# Patient Record
Sex: FEMALE | Race: WHITE | NOT HISPANIC OR LATINO
[De-identification: names, ages, dates, MRNs, and addresses within clinical notes are randomized per-mention and may not be internally consistent; named-entity substitution may affect disease eponyms.]

---

## 2017-06-05 ENCOUNTER — APPOINTMENT (OUTPATIENT)
Dept: PULMONOLOGY | Facility: CLINIC | Age: 57
End: 2017-06-05

## 2017-06-05 VITALS
HEIGHT: 65 IN | SYSTOLIC BLOOD PRESSURE: 140 MMHG | BODY MASS INDEX: 40.65 KG/M2 | HEART RATE: 82 BPM | OXYGEN SATURATION: 96 % | DIASTOLIC BLOOD PRESSURE: 80 MMHG | RESPIRATION RATE: 17 BRPM | WEIGHT: 244 LBS

## 2017-06-05 DIAGNOSIS — J45.901 ACUTE BRONCHITIS, UNSPECIFIED: ICD-10-CM

## 2017-06-05 DIAGNOSIS — R06.02 SHORTNESS OF BREATH: ICD-10-CM

## 2017-06-05 DIAGNOSIS — Z86.711 PERSONAL HISTORY OF PULMONARY EMBOLISM: ICD-10-CM

## 2017-06-05 DIAGNOSIS — Z86.39 PERSONAL HISTORY OF OTHER ENDOCRINE, NUTRITIONAL AND METABOLIC DISEASE: ICD-10-CM

## 2017-06-05 DIAGNOSIS — K21.9 GASTRO-ESOPHAGEAL REFLUX DISEASE W/OUT ESOPHAGITIS: ICD-10-CM

## 2017-06-05 DIAGNOSIS — J45.909 UNSPECIFIED ASTHMA, UNCOMPLICATED: ICD-10-CM

## 2017-06-05 DIAGNOSIS — J30.9 ALLERGIC RHINITIS, UNSPECIFIED: ICD-10-CM

## 2017-06-05 DIAGNOSIS — J20.9 ACUTE BRONCHITIS, UNSPECIFIED: ICD-10-CM

## 2017-06-05 DIAGNOSIS — R93.8 ABNORMAL FINDINGS ON DIAGNOSTIC IMAGING OF OTHER SPECIFIED BODY STRUCTURES: ICD-10-CM

## 2017-06-05 RX ORDER — PANTOPRAZOLE 40 MG/1
40 TABLET, DELAYED RELEASE ORAL
Refills: 0 | Status: ACTIVE | COMMUNITY

## 2017-06-05 RX ORDER — ALBUTEROL SULFATE 2.5 MG/3ML
(2.5 MG/3ML) SOLUTION RESPIRATORY (INHALATION)
Qty: 120 | Refills: 3 | Status: ACTIVE | COMMUNITY
Start: 2017-06-05 | End: 1900-01-01

## 2017-06-06 ENCOUNTER — TRANSCRIPTION ENCOUNTER (OUTPATIENT)
Age: 57
End: 2017-06-06

## 2017-06-20 ENCOUNTER — MEDICATION RENEWAL (OUTPATIENT)
Age: 57
End: 2017-06-20

## 2017-07-31 ENCOUNTER — OUTPATIENT (OUTPATIENT)
Dept: OUTPATIENT SERVICES | Facility: HOSPITAL | Age: 57
LOS: 1 days | End: 2017-07-31
Payer: COMMERCIAL

## 2017-07-31 VITALS
OXYGEN SATURATION: 96 % | WEIGHT: 250 LBS | HEART RATE: 77 BPM | SYSTOLIC BLOOD PRESSURE: 114 MMHG | TEMPERATURE: 98 F | HEIGHT: 64 IN | RESPIRATION RATE: 16 BRPM | DIASTOLIC BLOOD PRESSURE: 74 MMHG

## 2017-07-31 DIAGNOSIS — I20.0 UNSTABLE ANGINA: ICD-10-CM

## 2017-07-31 LAB
ALBUMIN SERPL ELPH-MCNC: 4.5 G/DL — SIGNIFICANT CHANGE UP (ref 3.3–5)
ALP SERPL-CCNC: 102 U/L — SIGNIFICANT CHANGE UP (ref 40–120)
ALT FLD-CCNC: 18 U/L RC — SIGNIFICANT CHANGE UP (ref 10–45)
ANION GAP SERPL CALC-SCNC: 15 MMOL/L — SIGNIFICANT CHANGE UP (ref 5–17)
AST SERPL-CCNC: 22 U/L — SIGNIFICANT CHANGE UP (ref 10–40)
BILIRUB SERPL-MCNC: 0.4 MG/DL — SIGNIFICANT CHANGE UP (ref 0.2–1.2)
BUN SERPL-MCNC: 11 MG/DL — SIGNIFICANT CHANGE UP (ref 7–23)
CALCIUM SERPL-MCNC: 10 MG/DL — SIGNIFICANT CHANGE UP (ref 8.4–10.5)
CHLORIDE SERPL-SCNC: 108 MMOL/L — SIGNIFICANT CHANGE UP (ref 96–108)
CO2 SERPL-SCNC: 20 MMOL/L — LOW (ref 22–31)
CREAT SERPL-MCNC: 1.21 MG/DL — SIGNIFICANT CHANGE UP (ref 0.5–1.3)
GLUCOSE SERPL-MCNC: 107 MG/DL — HIGH (ref 70–99)
HCT VFR BLD CALC: 39.2 % — SIGNIFICANT CHANGE UP (ref 34.5–45)
HGB BLD-MCNC: 13 G/DL — SIGNIFICANT CHANGE UP (ref 11.5–15.5)
MCHC RBC-ENTMCNC: 29.3 PG — SIGNIFICANT CHANGE UP (ref 27–34)
MCHC RBC-ENTMCNC: 33.1 GM/DL — SIGNIFICANT CHANGE UP (ref 32–36)
MCV RBC AUTO: 88.3 FL — SIGNIFICANT CHANGE UP (ref 80–100)
PLATELET # BLD AUTO: 167 K/UL — SIGNIFICANT CHANGE UP (ref 150–400)
POTASSIUM SERPL-MCNC: 4.1 MMOL/L — SIGNIFICANT CHANGE UP (ref 3.5–5.3)
POTASSIUM SERPL-SCNC: 4.1 MMOL/L — SIGNIFICANT CHANGE UP (ref 3.5–5.3)
PROT SERPL-MCNC: 7.3 G/DL — SIGNIFICANT CHANGE UP (ref 6–8.3)
RBC # BLD: 4.43 M/UL — SIGNIFICANT CHANGE UP (ref 3.8–5.2)
RBC # FLD: 13.6 % — SIGNIFICANT CHANGE UP (ref 10.3–14.5)
SODIUM SERPL-SCNC: 143 MMOL/L — SIGNIFICANT CHANGE UP (ref 135–145)
WBC # BLD: 6 K/UL — SIGNIFICANT CHANGE UP (ref 3.8–10.5)
WBC # FLD AUTO: 6 K/UL — SIGNIFICANT CHANGE UP (ref 3.8–10.5)

## 2017-07-31 PROCEDURE — 93005 ELECTROCARDIOGRAM TRACING: CPT

## 2017-07-31 PROCEDURE — 93454 CORONARY ARTERY ANGIO S&I: CPT

## 2017-07-31 PROCEDURE — 99153 MOD SED SAME PHYS/QHP EA: CPT

## 2017-07-31 PROCEDURE — 93010 ELECTROCARDIOGRAM REPORT: CPT

## 2017-07-31 PROCEDURE — 93454 CORONARY ARTERY ANGIO S&I: CPT | Mod: 26,GC

## 2017-07-31 PROCEDURE — 99152 MOD SED SAME PHYS/QHP 5/>YRS: CPT

## 2017-07-31 PROCEDURE — C1887: CPT

## 2017-07-31 PROCEDURE — C1894: CPT

## 2017-07-31 PROCEDURE — C1769: CPT

## 2017-07-31 PROCEDURE — 80053 COMPREHEN METABOLIC PANEL: CPT

## 2017-07-31 PROCEDURE — 85027 COMPLETE CBC AUTOMATED: CPT

## 2017-07-31 NOTE — H&P CARDIOLOGY - HISTORY OF PRESENT ILLNESS
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 Umana c/o episodes of chest tightness associated with LE weakness and dyspnea one week ago.  Patient states symptoms resolved with rest.  Patient endorsing stress test 1-2 years ago with normal findings (report unavailable) and states that she feels she could not withstand stress testing now.  Decision was made to proceed with [facility] for which patient presents today.

## 2017-07-31 NOTE — H&P CARDIOLOGY - PSH
Dysphonia  gel injection into vocal cord 1/11  Mass  benign mas removed between heart and spine, 8/07  S/P Dilatation and Curettage  9/09, uterine ablation  S/P Tonsillectomy and Adenoidectomy    Status Post Umbilical Hernia Repair, Follow-Up Exam  10/05  Tubal Ligation  1995

## 2017-07-31 NOTE — H&P CARDIOLOGY - FAMILY HISTORY
Father  Still living? Unknown  Family history of cardiovascular disease, Age at diagnosis: Age Unknown     Mother  Still living? Unknown  Family history of cardiovascular disease, Age at diagnosis: Age Unknown

## 2017-07-31 NOTE — H&P CARDIOLOGY - PMH
Antiphospholipid syndrome    Asthma    CVA (Cerebral Vascular Accident)  lacunar 1/09 and intracranial bleed 1992  Diverticulitis    DUB (Dysfunctional Uterine Bleeding)  2009  DVT (Deep Venous Thrombosis)  1975, 1982  History of Pulmonary Embolism  8/07  Hypercholesterolemia    Hypertension  1992  USAMA (obstructive sleep apnea)    Spasmodic Dysphonia  11/10  Uterine Prolapse

## 2017-09-05 ENCOUNTER — APPOINTMENT (OUTPATIENT)
Dept: PULMONOLOGY | Facility: CLINIC | Age: 57
End: 2017-09-05

## 2018-01-29 ENCOUNTER — RX RENEWAL (OUTPATIENT)
Age: 58
End: 2018-01-29

## 2022-11-16 ENCOUNTER — TRANSCRIPTION ENCOUNTER (OUTPATIENT)
Age: 62
End: 2022-11-16

## 2022-11-16 PROBLEM — J45.909 UNSPECIFIED ASTHMA, UNCOMPLICATED: Chronic | Status: ACTIVE | Noted: 2017-07-31

## 2022-11-16 PROBLEM — G47.33 OBSTRUCTIVE SLEEP APNEA (ADULT) (PEDIATRIC): Chronic | Status: ACTIVE | Noted: 2017-07-31

## 2023-01-11 ENCOUNTER — APPOINTMENT (OUTPATIENT)
Dept: ORTHOPEDIC SURGERY | Facility: CLINIC | Age: 63
End: 2023-01-11
Payer: COMMERCIAL

## 2023-01-11 VITALS — BODY MASS INDEX: 39.15 KG/M2 | WEIGHT: 235 LBS | HEIGHT: 65 IN

## 2023-01-11 PROCEDURE — 99214 OFFICE O/P EST MOD 30 MIN: CPT

## 2023-01-11 PROCEDURE — 72170 X-RAY EXAM OF PELVIS: CPT

## 2023-01-11 PROCEDURE — 72110 X-RAY EXAM L-2 SPINE 4/>VWS: CPT

## 2023-01-11 NOTE — DISCUSSION/SUMMARY
[de-identified] : degenerative spondylolisthesis with worsening back/leg symptoms - indicated for updated lumbar MRi \par discussion of tx optoins \par flexeril/MDP \par fu to review the MRi

## 2023-01-11 NOTE — HISTORY OF PRESENT ILLNESS
[Lower back] : lower back [Gradual] : gradual [9] : 9 [8] : 8 [Burning] : burning [Dull/Aching] : dull/aching [Localized] : localized [Radiating] : radiating [Tightness] : tightness [Constant] : constant [Household chores] : household chores [Nothing helps with pain getting better] : Nothing helps with pain getting better [Standing] : standing [Stairs] : stairs [de-identified] : History of Present Illness\par 6/2/21: 59 yo RHD F with cervical pain for the past 2 months and lumbar pain for the past 6-8 weeks, both with no\par specific injury. \par She has tried ice and rest to minimal relief. She is unable to take NSAIDs (on xarleto)\par Has tried ice to some benefit. \par She was seen by her PCP who gave her a muscle relaxer that seemed to be somewhat beneficial. \par No prior PT/injections/chiropractor/surgery/HEP.\par Denies b/b incontinence. \par Lumbar pain radiates down bilateral L>R legs. Admits to one instance of tingling in the L foot this past week. \par Denies cervical radic, N/T, weakness. \par PMHx: HTN, hyperlipidemia Menieries, antiphoslipid syndrome \par Hx of skin cancer. 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 No fracture.\par 2. L1-L2: Facet arthrosis with facet joint effusions.\par 3. L2-L3: Loss of disc signal and height with minor retrolisthesis. Broad bulge, facet arthrosis and facet joint\par effusions. Inferior foraminal stenosis. Central herniation and annular fissure.\par 4. L3-L4: Minor retrolisthesis. Loss of disc signal and height. Broad bulge and facet arthrosis with facet joint\par effusions. Inferior foraminal stenosis. Central herniation and annular fissure.\par 5. L4-L5: Grade 1 anterior spondylolisthesis. Loss of disc signal. Bulge and facet arthrosis with facet joint\par effusions. Mild-to-moderate central stenosis.\par -----------------------------------------------\par \par 01/11/23 here today with lower spine pain radiating down the legs ,numbness\par pt has try physical therapy in the past which made it worse - pain radiates down the right side\par pt is off balance  \par \par No loss of bb control \par \par No recent PT/chiro/accupuncture/injection\par cant take nsaids (on xarelto)\par \par xrays today:\par L spine - DS at L4-5, diffuse spondylosis \par AP PELVIS-  negative for fx [] : no [FreeTextEntry7] : legs  [de-identified] : from sitting to standing  [de-identified] : Dr Linares  [de-identified] : nothing

## 2023-01-16 ENCOUNTER — APPOINTMENT (OUTPATIENT)
Dept: MRI IMAGING | Facility: CLINIC | Age: 63
End: 2023-01-16

## 2023-01-23 ENCOUNTER — RX RENEWAL (OUTPATIENT)
Age: 63
End: 2023-01-23

## 2023-01-27 ENCOUNTER — RESULT REVIEW (OUTPATIENT)
Age: 63
End: 2023-01-27

## 2023-02-01 ENCOUNTER — APPOINTMENT (OUTPATIENT)
Dept: ORTHOPEDIC SURGERY | Facility: CLINIC | Age: 63
End: 2023-02-01
Payer: COMMERCIAL

## 2023-02-01 VITALS — WEIGHT: 235 LBS | HEIGHT: 65 IN | BODY MASS INDEX: 39.15 KG/M2

## 2023-02-01 DIAGNOSIS — M54.16 RADICULOPATHY, LUMBAR REGION: ICD-10-CM

## 2023-02-01 DIAGNOSIS — M51.26 OTHER INTERVERTEBRAL DISC DISPLACEMENT, LUMBAR REGION: ICD-10-CM

## 2023-02-01 DIAGNOSIS — M43.16 SPONDYLOLISTHESIS, LUMBAR REGION: ICD-10-CM

## 2023-02-01 DIAGNOSIS — M47.816 SPONDYLOSIS W/OUT MYELOPATHY OR RADICULOPATHY, LUMBAR REGION: ICD-10-CM

## 2023-02-01 PROCEDURE — 99214 OFFICE O/P EST MOD 30 MIN: CPT

## 2023-02-01 NOTE — HISTORY OF PRESENT ILLNESS
[Lower back] : lower back [Gradual] : gradual [6] : 6 [5] : 5 [Burning] : burning [Dull/Aching] : dull/aching [Localized] : localized [Radiating] : radiating [Tightness] : tightness [Intermittent] : intermittent [Household chores] : household chores [Nothing helps with pain getting better] : Nothing helps with pain getting better [Standing] : standing [Stairs] : stairs [de-identified] : History of Present Illness\par 6/2/21: 61 yo RHD F with cervical pain for the past 2 months and lumbar pain for the past 6-8 weeks, both with no\par specific injury. \par She has tried ice and rest to minimal relief. She is unable to take NSAIDs (on xarleto)\par Has tried ice to some benefit. \par She was seen by her PCP who gave her a muscle relaxer that seemed to be somewhat beneficial. \par No prior PT/injections/chiropractor/surgery/HEP.\par Denies b/b incontinence. \par Lumbar pain radiates down bilateral L>R legs. Admits to one instance of tingling in the L foot this past week. \par Denies cervical radic, N/T, weakness. \par PMHx: HTN, hyperlipidemia Menieries, antiphoslipid syndrome \par Hx of skin cancer. 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 No fracture.\par 2. L1-L2: Facet arthrosis with facet joint effusions.\par 3. L2-L3: Loss of disc signal and height with minor retrolisthesis. Broad bulge, facet arthrosis and facet joint\par effusions. Inferior foraminal stenosis. Central herniation and annular fissure.\par 4. L3-L4: Minor retrolisthesis. Loss of disc signal and height. Broad bulge and facet arthrosis with facet joint\par effusions. Inferior foraminal stenosis. Central herniation and annular fissure.\par 5. L4-L5: Grade 1 anterior spondylolisthesis. Loss of disc signal. Bulge and facet arthrosis with facet joint\par effusions. Mild-to-moderate central stenosis.\par -----------------------------------------------\par \par 01/11/23 here today with lower spine pain radiating down the legs ,numbness\par pt has try physical therapy in the past which made it worse - pain radiates down the right side\par pt is off balance  \par \par No loss of bb control \par \par No recent PT/chiro/accupuncture/injection\par cant take nsaids (on xarelto)\par \par xrays today:\par L spine - DS at L4-5, diffuse spondylosis \par AP PELVIS-  negative for fx\par \par 02/01/23: Here for fu; plan last visit was, "degenerative spondylolisthesis with worsening back/leg symptoms - indicated for updated lumbar MRi . discussion of tx optoins. flexeril/MDP. fu to review the MRi." Overall feeling a little better with the MDP\par \par MRI L-spine 1/27/23\par Mild L4-L5 spondylolisthesis with spondylosis and facet arthrosis most\par pronounced at L4-L5 where there is likely abutment of the descending L5 nerve\par roots. Small facet joint effusions and additional findings at the levels, as\par above.\par \par  [] : no [de-identified] : from sitting to standing  [FreeTextEntry7] : legs  [de-identified] : Dr Linares  [de-identified] : mri done at [facility]  [de-identified] : medrol dose pack\par flexeril

## 2023-02-01 NOTE — DATA REVIEWED
[MRI] : MRI [Lumbar Spine] : lumbar spine [I independently reviewed and interpreted images and report] : I independently reviewed and interpreted images and report [I reviewed the films/CD and agree] : I reviewed the films/CD and agree

## 2023-02-01 NOTE — DISCUSSION/SUMMARY
[de-identified] : reivewed the MRi of the l spine \par DS l4-5 with stenosis \par disucssion of tx optoins \par would be a candidate for LESI l4-5

## 2023-02-21 ENCOUNTER — RX RENEWAL (OUTPATIENT)
Age: 63
End: 2023-02-21

## 2023-03-08 NOTE — H&P CARDIOLOGY - BP NONINVASIVE DIASTOLIC (MM HG)
Additional History: Patient expresses concern of a changing mole on her arm. Patient states it has been present for years but has recently started to evolve. Patient also notes a recent acne breakout after stopping birth control in July. \\n\\nPatient was screened before evaluation for COVID-19 by inquiring about fever, shortness of breath, weakness, fatigue, loss of taste or smell and gastrointestinal symptoms. Patient denied any of the above. 74

## 2023-04-12 ENCOUNTER — RX RENEWAL (OUTPATIENT)
Age: 63
End: 2023-04-12

## 2023-06-15 ENCOUNTER — EMERGENCY (EMERGENCY)
Facility: HOSPITAL | Age: 63
LOS: 1 days | Discharge: HOME | End: 2023-06-15
Attending: EMERGENCY MEDICINE | Admitting: EMERGENCY MEDICINE
Payer: COMMERCIAL

## 2023-06-15 VITALS
HEART RATE: 80 BPM | TEMPERATURE: 98 F | SYSTOLIC BLOOD PRESSURE: 112 MMHG | WEIGHT: 224.87 LBS | OXYGEN SATURATION: 97 % | RESPIRATION RATE: 15 BRPM | DIASTOLIC BLOOD PRESSURE: 75 MMHG | HEIGHT: 65 IN

## 2023-06-15 PROCEDURE — 71046 X-RAY EXAM CHEST 2 VIEWS: CPT | Mod: 26

## 2023-06-15 PROCEDURE — 90471 IMMUNIZATION ADMIN: CPT

## 2023-06-15 PROCEDURE — 99284 EMERGENCY DEPT VISIT MOD MDM: CPT | Mod: 25

## 2023-06-15 PROCEDURE — 70450 CT HEAD/BRAIN W/O DYE: CPT | Mod: MA

## 2023-06-15 PROCEDURE — 73000 X-RAY EXAM OF COLLAR BONE: CPT

## 2023-06-15 PROCEDURE — 90714 TD VACC NO PRESV 7 YRS+ IM: CPT

## 2023-06-15 PROCEDURE — 73000 X-RAY EXAM OF COLLAR BONE: CPT | Mod: 26,LT

## 2023-06-15 PROCEDURE — 73030 X-RAY EXAM OF SHOULDER: CPT

## 2023-06-15 PROCEDURE — 71046 X-RAY EXAM CHEST 2 VIEWS: CPT

## 2023-06-15 PROCEDURE — 72125 CT NECK SPINE W/O DYE: CPT | Mod: 26,MA

## 2023-06-15 PROCEDURE — 70450 CT HEAD/BRAIN W/O DYE: CPT | Mod: 26,MA

## 2023-06-15 PROCEDURE — 99285 EMERGENCY DEPT VISIT HI MDM: CPT

## 2023-06-15 PROCEDURE — 73030 X-RAY EXAM OF SHOULDER: CPT | Mod: 26,LT

## 2023-06-15 PROCEDURE — 72125 CT NECK SPINE W/O DYE: CPT | Mod: MA

## 2023-06-15 RX ORDER — TETANUS AND DIPHTHERIA TOXOIDS ADSORBED 2; 2 [LF]/.5ML; [LF]/.5ML
0.5 INJECTION INTRAMUSCULAR ONCE
Refills: 0 | Status: COMPLETED | OUTPATIENT
Start: 2023-06-15 | End: 2023-06-15

## 2023-06-15 RX ADMIN — TETANUS AND DIPHTHERIA TOXOIDS ADSORBED 0.5 MILLILITER(S): 2; 2 INJECTION INTRAMUSCULAR at 14:07

## 2023-06-15 NOTE — ED ADULT NURSE NOTE - LATERALITY
Health Maintenance Due   Topic Date Due   â¢ COVID-19 Vaccine (1) 09/08/1948   â¢ Shingles Vaccine (2 of 3) 04/25/2017   â¢ Medicare Advantage- Medicare Wellness Visit  01/01/2022       Patient is due for the topics as listed above and has been informed a bout Shingrix.   [address] [facility] due in Nov. left

## 2023-06-15 NOTE — ED ADULT NURSE NOTE - NSFALLUNIVINTERV_ED_ALL_ED
Bed/Stretcher in lowest position, wheels locked, appropriate side rails in place/Call bell, personal items and telephone in reach/Instruct patient to call for assistance before getting out of bed/chair/stretcher/Non-slip footwear applied when patient is off stretcher/[place] to call system/Physically safe environment - no spills, clutter or unnecessary equipment/Purposeful proactive rounding/Room/bathroom lighting operational, light cord in reach

## 2023-06-15 NOTE — ED PROVIDER NOTE - NSICDXPASTSURGICALHX_GEN_ALL_CORE_FT
PAST SURGICAL HISTORY:  Dysphonia gel injection into vocal cord 1/11    Mass benign mas removed between heart and spine, 8/07    S/P Dilatation and Curettage 9/09, uterine ablation    S/P Tonsillectomy and Adenoidectomy     Status Post Umbilical Hernia Repair, Follow-Up Exam 10/05    Tubal Ligation 1995

## 2023-06-15 NOTE — ED PROVIDER NOTE - PHYSICAL EXAMINATION
· CONSTITUTIONAL: Well appearing, well nourished, awake, alert, oriented to person, place, time/situation and in no apparent distress. non-toxic, well appearing.   · ENMT: Airway patent, Nasal mucosa clear. Mouth with normal mucosa. Throat has no vesicles, no oropharyngeal exudates and uvula is midline. MM moist.  no external signs of head trauma.  Positive abrasion with hematoma to left lateral anterior forehead.  No crepitus.  No lateral/temporal tenderness.  · EYES: Clear bilaterally, pupils equal, round and reactive to light. Extra-ocular muscles intact.  · CARDIAC: Normal rate, regular rhythm.  Heart sounds S1, S2.  No murmurs, rubs or gallops.  · RESPIRATORY: Breath sounds clear and equal bilaterally. nl resp effort.  No Wheeze / Rhonci / Rales.  · GASTROINTESTINAL: Abdomen soft, non-tender, no guarding. non-distended. no hsm. no CVA tenderness. no acute signs of truncal trauma.  · GENITOURINARY:  Bladder: non-tender / non-distended  · MUSCULOSKELETAL: Spine appears normal, No spinal tenderness (Cervical, thoracic, Lumbar). Range of motion in all extremities is not limited, no muscle or joint tenderness except as noted  Positive tenderness to mid/distal left clavicle.  Full range of motion of left shoulder without pain.  No tenderness to the humerus.  Normal distal strength and sensation equal bilaterally.  No deformity to the clavicle noted.  · NEUROLOGICAL: Alert and oriented, no focal deficits, no motor or sensory deficits. Normal, non-focal detailed neurologic exam.  · SKIN: Skin normal color for race, warm, dry and intact. No evidence of rash.  · HEME LYMPH: No adenopathy or splenomegaly.

## 2023-06-15 NOTE — ED ADULT NURSE NOTE - ISOLATION TYPE:
None Topical Ketoconazole Counseling: Patient counseled that this medication may cause skin irritation or allergic reactions.  In the event of skin irritation, the patient was advised to reduce the amount of the drug applied or use it less frequently.   The patient verbalized understanding of the proper use and possible adverse effects of ketoconazole.  All of the patient's questions and concerns were addressed.

## 2023-06-15 NOTE — ED PROVIDER NOTE - CARE PROVIDER_API CALL
Bassam Butler  Internal Medicine  [address] [place] [id number]  Phone: [phone]  Fax: [phone]  Follow Up Time:     Chencho Solano  Orthopaedic Surgery  [address] [place] [address], [address]  [place] [address]  Phone: [phone]  Fax: [phone]  Follow Up Time:

## 2023-06-15 NOTE — ED PROVIDER NOTE - PATIENT PORTAL LINK FT
You can access the FollowMyHealth Patient Portal offered by [facility] by registering at the following website: [email or web address]. By joining [employer]’s FollowMyHealth portal, you will also be able to view your health information using other applications (apps) compatible with our system.
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
## 2023-06-15 NOTE — ED PROVIDER NOTE - DIFFERENTIAL DIAGNOSIS
Rule out acute fracture to the shoulder/clavicle, pneumothorax, intracranial hemorrhage, cervical spine fracture, other acute pathology Differential Diagnosis
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
## 2023-06-15 NOTE — ED PROVIDER NOTE - NSICDXPASTMEDICALHX_GEN_ALL_CORE_FT
PAST MEDICAL HISTORY:  Antiphospholipid syndrome     Asthma     CVA (Cerebral Vascular Accident) lacunar 1/09 and intracranial bleed 1992    Diverticulitis     DUB (Dysfunctional Uterine Bleeding) 2009    DVT (Deep Venous Thrombosis) 1975, 1982    History of Pulmonary Embolism 8/07    Hypercholesterolemia     Hypertension 1992    USAMA (obstructive sleep apnea)     Spasmodic Dysphonia 11/10    Uterine Prolapse

## 2023-06-15 NOTE — ED PROVIDER NOTE - NSFOLLOWUPINSTRUCTIONS_ED_ALL_ED_FT
1. Follow-up with your Primary Medical Doctor. Call today / next business day for prompt follow-up.  2. Return to Emergency room for any worsening or persistent pain, shortness of breath, chest pains, abdominal pain, numbness, tingling, headaches, vomiting, visual changes, dizziness, weakness, fever, or any other concerning symptoms.  3. See attached instruction sheets for additional information, including information regarding signs and symptoms to look out for, reasons to seek immediate care and other important instructions.  4. Follow-up with any orthopedics, call tomorrow for prompt follow-up  5.  Tylenol as needed for pain
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
## 2023-06-24 ENCOUNTER — APPOINTMENT (OUTPATIENT)
Dept: ORTHOPEDIC SURGERY | Facility: CLINIC | Age: 63
End: 2023-06-24

## 2023-08-01 ENCOUNTER — EMERGENCY (EMERGENCY)
Facility: HOSPITAL | Age: 63
LOS: 1 days | Discharge: HOME | End: 2023-08-01
Attending: EMERGENCY MEDICINE
Payer: COMMERCIAL

## 2023-08-01 VITALS
DIASTOLIC BLOOD PRESSURE: 98 MMHG | SYSTOLIC BLOOD PRESSURE: 192 MMHG | OXYGEN SATURATION: 97 % | HEIGHT: 65 IN | HEART RATE: 70 BPM | RESPIRATION RATE: 20 BRPM | WEIGHT: 229.94 LBS | TEMPERATURE: 98 F

## 2023-08-01 LAB
ALBUMIN SERPL ELPH-MCNC: 4.4 G/DL — SIGNIFICANT CHANGE UP (ref 3.3–5)
ALP SERPL-CCNC: 86 U/L — SIGNIFICANT CHANGE UP (ref 40–120)
ALT FLD-CCNC: 14 U/L — SIGNIFICANT CHANGE UP (ref 10–45)
ANION GAP SERPL CALC-SCNC: 13 MMOL/L — SIGNIFICANT CHANGE UP (ref 5–17)
ANISOCYTOSIS BLD QL: SLIGHT — SIGNIFICANT CHANGE UP
APTT BLD: 79.4 SEC — HIGH (ref 24.5–35.6)
AST SERPL-CCNC: 16 U/L — SIGNIFICANT CHANGE UP (ref 10–40)
BASOPHILS # BLD AUTO: 0 K/UL — SIGNIFICANT CHANGE UP (ref 0–0.2)
BASOPHILS NFR BLD AUTO: 0 % — SIGNIFICANT CHANGE UP (ref 0–2)
BILIRUB SERPL-MCNC: 0.5 MG/DL — SIGNIFICANT CHANGE UP (ref 0.2–1.2)
BUN SERPL-MCNC: 13 MG/DL — SIGNIFICANT CHANGE UP (ref 7–23)
CALCIUM SERPL-MCNC: 9.7 MG/DL — SIGNIFICANT CHANGE UP (ref 8.4–10.5)
CHLORIDE SERPL-SCNC: 103 MMOL/L — SIGNIFICANT CHANGE UP (ref 96–108)
CO2 SERPL-SCNC: 24 MMOL/L — SIGNIFICANT CHANGE UP (ref 22–31)
CREAT SERPL-MCNC: 0.95 MG/DL — SIGNIFICANT CHANGE UP (ref 0.5–1.3)
DACRYOCYTES BLD QL SMEAR: SLIGHT — SIGNIFICANT CHANGE UP
EGFR: 67 ML/MIN/1.73M2 — SIGNIFICANT CHANGE UP
ELLIPTOCYTES BLD QL SMEAR: SIGNIFICANT CHANGE UP
EOSINOPHIL # BLD AUTO: 0.08 K/UL — SIGNIFICANT CHANGE UP (ref 0–0.5)
EOSINOPHIL NFR BLD AUTO: 1.8 % — SIGNIFICANT CHANGE UP (ref 0–6)
GLUCOSE SERPL-MCNC: 90 MG/DL — SIGNIFICANT CHANGE UP (ref 70–99)
HCT VFR BLD CALC: 32.8 % — LOW (ref 34.5–45)
HGB BLD-MCNC: 9.9 G/DL — LOW (ref 11.5–15.5)
INR BLD: 1.35 RATIO — HIGH (ref 0.85–1.18)
LYMPHOCYTES # BLD AUTO: 0.82 K/UL — LOW (ref 1–3.3)
LYMPHOCYTES # BLD AUTO: 19.1 % — SIGNIFICANT CHANGE UP (ref 13–44)
MAGNESIUM SERPL-MCNC: 1.7 MG/DL — SIGNIFICANT CHANGE UP (ref 1.6–2.6)
MANUAL SMEAR VERIFICATION: SIGNIFICANT CHANGE UP
MCHC RBC-ENTMCNC: 23.5 PG — LOW (ref 27–34)
MCHC RBC-ENTMCNC: 30.2 GM/DL — LOW (ref 32–36)
MCV RBC AUTO: 77.7 FL — LOW (ref 80–100)
MICROCYTES BLD QL: SLIGHT — SIGNIFICANT CHANGE UP
MONOCYTES # BLD AUTO: 0.22 K/UL — SIGNIFICANT CHANGE UP (ref 0–0.9)
MONOCYTES NFR BLD AUTO: 5.2 % — SIGNIFICANT CHANGE UP (ref 2–14)
NEUTROPHILS # BLD AUTO: 3.16 K/UL — SIGNIFICANT CHANGE UP (ref 1.8–7.4)
NEUTROPHILS NFR BLD AUTO: 73.9 % — SIGNIFICANT CHANGE UP (ref 43–77)
OVALOCYTES BLD QL SMEAR: SIGNIFICANT CHANGE UP
PHOSPHATE SERPL-MCNC: 3.1 MG/DL — SIGNIFICANT CHANGE UP (ref 2.5–4.5)
PLAT MORPH BLD: NORMAL — SIGNIFICANT CHANGE UP
PLATELET # BLD AUTO: 186 K/UL — SIGNIFICANT CHANGE UP (ref 150–400)
POIKILOCYTOSIS BLD QL AUTO: SIGNIFICANT CHANGE UP
POTASSIUM SERPL-MCNC: 3.5 MMOL/L — SIGNIFICANT CHANGE UP (ref 3.5–5.3)
POTASSIUM SERPL-SCNC: 3.5 MMOL/L — SIGNIFICANT CHANGE UP (ref 3.5–5.3)
PROT SERPL-MCNC: 6.7 G/DL — SIGNIFICANT CHANGE UP (ref 6–8.3)
PROTHROM AB SERPL-ACNC: 14.7 SEC — HIGH (ref 9.5–13)
RBC # BLD: 4.22 M/UL — SIGNIFICANT CHANGE UP (ref 3.8–5.2)
RBC # FLD: 20.9 % — HIGH (ref 10.3–14.5)
RBC BLD AUTO: ABNORMAL
SODIUM SERPL-SCNC: 140 MMOL/L — SIGNIFICANT CHANGE UP (ref 135–145)
WBC # BLD: 4.28 K/UL — SIGNIFICANT CHANGE UP (ref 3.8–10.5)
WBC # FLD AUTO: 4.28 K/UL — SIGNIFICANT CHANGE UP (ref 3.8–10.5)

## 2023-08-01 PROCEDURE — 99285 EMERGENCY DEPT VISIT HI MDM: CPT

## 2023-08-01 PROCEDURE — 70498 CT ANGIOGRAPHY NECK: CPT | Mod: 26,MA

## 2023-08-01 PROCEDURE — 70496 CT ANGIOGRAPHY HEAD: CPT | Mod: 26,MA

## 2023-08-01 PROCEDURE — 70450 CT HEAD/BRAIN W/O DYE: CPT | Mod: 26,MA,59

## 2023-08-01 RX ORDER — ACETAMINOPHEN 500 MG
1000 TABLET ORAL ONCE
Refills: 0 | Status: COMPLETED | OUTPATIENT
Start: 2023-08-01 | End: 2023-08-01

## 2023-08-01 RX ORDER — ONDANSETRON 8 MG/1
4 TABLET, FILM COATED ORAL ONCE
Refills: 0 | Status: COMPLETED | OUTPATIENT
Start: 2023-08-01 | End: 2023-08-01

## 2023-08-01 RX ADMIN — ONDANSETRON 4 MILLIGRAM(S): 8 TABLET, FILM COATED ORAL at 19:45

## 2023-08-01 RX ADMIN — Medication 400 MILLIGRAM(S): at 22:14

## 2023-08-01 NOTE — ED PROVIDER NOTE - CLINICAL SUMMARY MEDICAL DECISION MAKING FREE TEXT BOX
62 y/o F with PMHx of antiphospholipid syndrome, spontaneous bleeds, PE on Xarelto presenting with 2 days of headache, eye pain and dizziness. Normal physical exam without focal neurologic findings. Sent from PMD for head CT. Plan to check labs, head CT and re-assess. 64 y/o F with PMHx of antiphospholipid syndrome, spontaneous bleeds, PE on Xarelto presenting with 2 days of headache, eye pain and dizziness. Normal physical exam without focal neurologic findings. Sent from PMD for head CT. Plan to check labs, given hx will obtain head CT noncon and CTA to r/o intracranial process. Pending results, if negative can d/c with close PCP f/u. 64 y/o F with PMHx of antiphospholipid syndrome, spontaneous bleeds, PE on Xarelto presenting with 2 days of headache, eye pain and dizziness. Normal physical exam without focal neurologic findings. Sent from PMD for head CT. Plan to check labs, given hx will obtain head CT noncon and CTA to r/o intracranial process. Pending results, if negative can d/c with close PCP f/u.    Attending MD Payton: See Attending Statement

## 2023-08-01 NOTE — ED PROVIDER NOTE - PROGRESS NOTE DETAILS
Patient declining pain medication for HA at this time. Lalita Ramos PGY-3: received signout on this patient.  Has a history of antiphospholipid syndrome complicated by spontaneous head bleeds.  Here with headache.  CT is pending. Lalita Ramos PGY-3: Patient now requesting pain medication, to give ofirmev. Lalita Ramos PGY-3: CT with "3 x 3 mm aneurysm off the proximal right A2 segment." NSX paged. Lalita Ramos PGY-3: NSX to see patient. Lalita Ramos PGY-3: NSX saw patient. To discuss imaging reads with radiology. Concerned she has antiphospholipid syndrome. Recommending obs until morning, rest of workup will likely be outpatient. Also recommending neurology consult for possible concussive syndrome and given her gait imbalance. Lalita Ramos PGY-3: Reassessed patient. HA and blurry vision improved, is no longer nauseous. Lazaro Perez DO:Patient evaluated by neurosurgery, recommended CT venogram..  Awaiting final neurosurgery recommendations. Lalita Ramos PGY-3: NSX saw patient. To discuss imaging reads with radiology. Concerned she has antiphospholipid syndrome. Recommending obs until morning, rest of workup will likely be outpatient. Recommending CTV to r/o venous thrombosis. Lalita Ramos PGY-3: CTV negative. NSX recommending neurology consult for possible concussive syndrome and given her gait imbalance. Lalita Ramos PGY-3: neurology to see patient.

## 2023-08-01 NOTE — ED PROVIDER NOTE - NSICDXFAMILYHX_GEN_ALL_CORE_FT
FAMILY HISTORY:  Father  Still living? Unknown  Family history of cardiovascular disease, Age at diagnosis: Age Unknown    Mother  Still living? Unknown  Family history of cardiovascular disease, Age at diagnosis: Age Unknown

## 2023-08-01 NOTE — ED PROVIDER NOTE - MUSCULOSKELETAL, MLM
Spine appears normal, range of motion is not limited, no muscle or joint tenderness, 5/5 strength in ISAC UE and LE

## 2023-08-01 NOTE — ED ADULT NURSE NOTE - NSFALLRISKINTERV_ED_ALL_ED

## 2023-08-01 NOTE — ED PROVIDER NOTE - OBJECTIVE STATEMENT
62 y/o F with PMHx of antiphospholipid syndrome and PE on Xarelto and ASA presents to ED c/o constant headache onset 2 days ago. Associated dizzy described as feeling off balance + room spinning and ISAC eye pain. Spoke with PCP this morning and due to hx of bleeding, it was recommended she come to ED to get a CT of the head. Denies loss of vision, falls/head trauma, vomiting, numbness or tingling in the extremities, neck pain, back pain, slurred speech or facial droop. Last eye exam ~1 year ago, no recent changes in glasses rx. Taking Gabapentin without relief. 62 y/o F with PMHx of antiphospholipid syndrome and PE on Xarelto and ASA presents to ED c/o constant headache onset 2 days ago. Associated dizzy described as feeling off balance + room spinning and ISAC eye pain. Spoke with PCP this morning and due to hx of bleeding, it was recommended she come to ED to get a CT of the head. Denies loss of vision, falls/head trauma, vomiting, numbness or tingling in the extremities, neck pain, back pain, slurred speech or facial droop. Last eye exam ~1 year ago, no recent changes in glasses rx. Taking Gabapentin without relief.  PMD: Luke
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
## 2023-08-02 VITALS
TEMPERATURE: 98 F | HEART RATE: 71 BPM | DIASTOLIC BLOOD PRESSURE: 84 MMHG | SYSTOLIC BLOOD PRESSURE: 127 MMHG | RESPIRATION RATE: 18 BRPM | OXYGEN SATURATION: 95 %

## 2023-08-02 LAB
A1C WITH ESTIMATED AVERAGE GLUCOSE RESULT: 5.1 % — SIGNIFICANT CHANGE UP (ref 4–5.6)
CHOLEST SERPL-MCNC: 171 MG/DL — SIGNIFICANT CHANGE UP
ESTIMATED AVERAGE GLUCOSE: 100 MG/DL — SIGNIFICANT CHANGE UP (ref 68–114)
HDLC SERPL-MCNC: 45 MG/DL — LOW
LIPID PNL WITH DIRECT LDL SERPL: 109 MG/DL — HIGH
NON HDL CHOLESTEROL: 126 MG/DL — SIGNIFICANT CHANGE UP
TRIGL SERPL-MCNC: 96 MG/DL — SIGNIFICANT CHANGE UP

## 2023-08-02 PROCEDURE — 70498 CT ANGIOGRAPHY NECK: CPT | Mod: MA

## 2023-08-02 PROCEDURE — 85610 PROTHROMBIN TIME: CPT

## 2023-08-02 PROCEDURE — 72141 MRI NECK SPINE W/O DYE: CPT | Mod: 26,MA

## 2023-08-02 PROCEDURE — 80061 LIPID PANEL: CPT

## 2023-08-02 PROCEDURE — 99285 EMERGENCY DEPT VISIT HI MDM: CPT | Mod: GC

## 2023-08-02 PROCEDURE — 70551 MRI BRAIN STEM W/O DYE: CPT | Mod: MA

## 2023-08-02 PROCEDURE — 70496 CT ANGIOGRAPHY HEAD: CPT | Mod: MA

## 2023-08-02 PROCEDURE — 99236 HOSP IP/OBS SAME DATE HI 85: CPT

## 2023-08-02 PROCEDURE — 83735 ASSAY OF MAGNESIUM: CPT

## 2023-08-02 PROCEDURE — 83036 HEMOGLOBIN GLYCOSYLATED A1C: CPT

## 2023-08-02 PROCEDURE — 99285 EMERGENCY DEPT VISIT HI MDM: CPT | Mod: 25

## 2023-08-02 PROCEDURE — 72141 MRI NECK SPINE W/O DYE: CPT | Mod: MA

## 2023-08-02 PROCEDURE — 85730 THROMBOPLASTIN TIME PARTIAL: CPT

## 2023-08-02 PROCEDURE — 70450 CT HEAD/BRAIN W/O DYE: CPT | Mod: MA

## 2023-08-02 PROCEDURE — 80053 COMPREHEN METABOLIC PANEL: CPT

## 2023-08-02 PROCEDURE — G0378: CPT

## 2023-08-02 PROCEDURE — 96375 TX/PRO/DX INJ NEW DRUG ADDON: CPT | Mod: XU

## 2023-08-02 PROCEDURE — 99222 1ST HOSP IP/OBS MODERATE 55: CPT

## 2023-08-02 PROCEDURE — 85025 COMPLETE CBC W/AUTO DIFF WBC: CPT

## 2023-08-02 PROCEDURE — 84100 ASSAY OF PHOSPHORUS: CPT

## 2023-08-02 PROCEDURE — 70551 MRI BRAIN STEM W/O DYE: CPT | Mod: 26,MA

## 2023-08-02 PROCEDURE — 96374 THER/PROPH/DIAG INJ IV PUSH: CPT | Mod: XU

## 2023-08-02 PROCEDURE — 70496 CT ANGIOGRAPHY HEAD: CPT | Mod: 26,MA

## 2023-08-02 RX ORDER — ASPIRIN/CALCIUM CARB/MAGNESIUM 324 MG
81 TABLET ORAL DAILY
Refills: 0 | Status: DISCONTINUED | OUTPATIENT
Start: 2023-08-02 | End: 2023-08-05

## 2023-08-02 RX ORDER — RIVAROXABAN 15 MG-20MG
20 KIT ORAL
Refills: 0 | Status: DISCONTINUED | OUTPATIENT
Start: 2023-08-02 | End: 2023-08-05

## 2023-08-02 RX ORDER — ATORVASTATIN CALCIUM 80 MG/1
80 TABLET, FILM COATED ORAL AT BEDTIME
Refills: 0 | Status: DISCONTINUED | OUTPATIENT
Start: 2023-08-02 | End: 2023-08-05

## 2023-08-02 RX ORDER — LABETALOL HCL 100 MG
600 TABLET ORAL
Refills: 0 | Status: DISCONTINUED | OUTPATIENT
Start: 2023-08-02 | End: 2023-08-05

## 2023-08-02 RX ADMIN — Medication 600 MILLIGRAM(S): at 17:17

## 2023-08-02 RX ADMIN — RIVAROXABAN 20 MILLIGRAM(S): KIT at 17:17

## 2023-08-02 NOTE — ED CDU PROVIDER INITIAL DAY NOTE - DETAILS
64 y/o F with PMHx of antiphospholipid syndrome and PE on Xarelto and ASA presents to ED c/o constant headache onset 2 days ago.  Plan: frequent reeval, vitals q 4hrs, tele, neuro checks and MRI brain/C spine w/o contrast.
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
## 2023-08-02 NOTE — ED CDU PROVIDER DISPOSITION NOTE - ATTENDING APP SHARED VISIT CONTRIBUTION OF CARE
CDU Attending Note -- Pt seen and examined at bedside.  Case discussed c CDU PA.  Pt comfortable, asymptomatic, and has good follow up with neurology.  Neuro/ophtho recs appreciated.  At this time there is no further work-up or treatment required to necessitate further CDU monitoring or admission.  Strict return precautions provided to patient.  Will discharge.  --LOVE

## 2023-08-02 NOTE — ED ADULT NURSE REASSESSMENT NOTE - NSFALLUNIVINTERV_ED_ALL_ED
Bed/Stretcher in lowest position, wheels locked, appropriate side rails in place/Call bell, personal items and telephone in reach/Instruct patient to call for assistance before getting out of bed/chair/stretcher/Non-slip footwear applied when patient is off stretcher/[place] to call system/Physically safe environment - no spills, clutter or unnecessary equipment/Purposeful proactive rounding/Room/bathroom lighting operational, light cord in reach

## 2023-08-02 NOTE — ED CDU PROVIDER INITIAL DAY NOTE - PROGRESS NOTE DETAILS
CDU NOTE DENIS Hamilton: pt resting comfortably, feels much improved, headache very mild now 1/10. no other complaints. NAD VSS. CDU NOTE DENIS Hamilton: pt seen by Neuro attending Dr. Newsome- recommending ophtho consult. if no ocular pathology, pt cleared from neuro to f/up outpatient with Dr. Coy. CDU NOTE DENIS Hamilton: spoke with ophtho- findings consistent with dry eyes. no other ocular pathology. recommend stroke work-up by neuro. otherwise no further recommendations from ophtho except for treatment for dry eyes. pt already worked up with CT angio head/neck and MRI brain. no acute stroke. tele monitoring without events. spoke with neuro, no further work-up to be done here, can have echo with bubble study outpatient. pt cleared from neuro.   as per Dr. Joya, pt stable for d/c home
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
## 2023-08-02 NOTE — ED CDU PROVIDER DISPOSITION NOTE - PATIENT PORTAL LINK FT
You can access the FollowMyHealth Patient Portal offered by [facility] by registering at the following website: [email or web address]. By joining [facility]’s FollowMyHealth portal, you will also be able to view your health information using other applications (apps) compatible with our system.
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
## 2023-08-02 NOTE — ED CDU PROVIDER DISPOSITION NOTE - CARE PROVIDER_API CALL
Celestine Oliveira  Orthopaedic Surgery  [address] [facility], [facility] [[email or web address]] [address]  [place] [address]  Phone: [phone]  Fax: [phone]  Follow Up Time:

## 2023-08-02 NOTE — CONSULT NOTE ADULT - ASSESSMENT
INCOMPLETE NOTE, FINAL RECS TO FOLLOW    Assessment & Recommendations:  64 y/o F with PMHx of antiphospholipid syndrome and PE on Xarelto and ASA, migraines w/o aura, HTN, HLD, and prior history of ICH presents to ED with severe HA and bilateral blurry vision x2 days, found to have     #Dry Eye OU  #Meibomian Gland Dysfunction OU   - Noted on exam to have lash scurf, telangiectasias, meibomian gland capping and decreased tear break up time, findings consistent with dry eye iso meibomian gland dysfunction.  - Recommend artificial tears QID in both eyes   - Recommend lacrilube at bedtime in both eyes  - Recommend warm compresses BID-TID  -         Patient seen and discussed with Dr. Morgan     Outpatient follow-up: Patient should follow-up with his/her ophthalmologist or with [facility] Department of Ophthalmology at the address below     [address] [place] [address]. [address]  [place], [place] [id number] [phone]     Assessment & Recommendations:  64 y/o F with PMHx of antiphospholipid syndrome and PE on Xarelto and ASA, migraines w/o aura, HTN, HLD, and prior history of ICH presents to ED with severe HA and bilateral blurry vision x2 days, found to have dry eyes    #transient vision loss  OU   - DDX includes migraine vs TIA   - Would reccoemnd full stroke workup   - MR  #Dry Eye OU  #Meibomian Gland Dysfunction OU   - Noted on exam to have lash scurf, telangiectasias, meibomian gland capping and decreased tear break up time, findings consistent with dry eye iso meibomian gland dysfunction.  - Recommend artificial tears QID in both eyes   - Recommend lacrilube at bedtime in both eyes  - Recommend warm compresses BID-TID  -         Patient seen and discussed with Dr. Morgan     Outpatient follow-up: Patient should follow-up with his/her ophthalmologist or with [facility] Department of Ophthalmology at the address below     [address] [place] [address]. [address]  [place] [address]  [phone]     Assessment & Recommendations:  62 y/o F with PMHx of antiphospholipid syndrome and PE on Xarelto and ASA, migraines w/o aura, HTN, HLD, and prior history of ICH presents to ED with severe HA and bilateral blurry vision x2 days, found to have dry eyes    #transient vision loss  OU   - DDX includes migraine vs TIA   - Would recommend full stroke workup per neurology    - MR shows chris acute findings.     #Dry Eye OU  #Meibomian Gland Dysfunction OU   - Noted on exam to have lash scurf, telangiectasias, meibomian gland capping and decreased tear break up time, findings consistent with dry eye iso meibomian gland dysfunction.  - Recommend artificial tears QID in both eyes   - Recommend lacrilube at bedtime in both eyes  - Recommend warm compresses BID-TID    Patient seen and discussed with Dr. Morgan     Outpatient follow-up: Patient should follow-up with his/her ophthalmologist or with [facility] Department of Ophthalmology at the address below     [address] [place] [address]. [address]  [place] [address]  [phone]     Assessment & Recommendations:  64 y/o F with PMHx of antiphospholipid syndrome and PE on Xarelto and ASA, migraines w/o aura, HTN, HLD, and prior history of ICH presents to ED with severe HA and bilateral blurry vision , found to have dry eyes    #transient vision loss  OU   - DDX includes migraine vs TIA   - Would recommend full stroke workup per neurology    - MR shows no acute findings.     #Dry Eye OU  #Meibomian Gland Dysfunction OU   - Noted on exam to have lash scurf, telangiectasias, meibomian gland capping and decreased tear break up time, findings consistent with dry eye iso meibomian gland dysfunction.  - Recommend artificial tears QID in both eyes   - Recommend lacrilube at bedtime in both eyes  - Recommend warm compresses BID-TID    Patient seen and discussed with Dr. Morgan     Outpatient follow-up: Patient should follow-up with his/her ophthalmologist or with [facility] Department of Ophthalmology at the address below     [address] [place] [address]. [address]  [place] [address]  [phone]
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
## 2023-08-02 NOTE — ED ADULT NURSE REASSESSMENT NOTE - NS ED NURSE REASSESS COMMENT FT1
1500 Report received from EDUAR Marinelli  Pt AAOx4, NAD, resp nonlabored, skin warm/dry, resting comfortably in bed. Pt denies headache, dizziness, chest pain, palpitations, SOB, abd pain, n/v/d, urinary symptoms, fevers, chills, weakness at this time. Pt awaiting for results . Safety maintained with call bell within reach.
Report received from TREVIN Mcmanus. Patient resting in bed, c/o nausea. Will notify MD. Pending CT. Family at bedside.
Pt received from TREVIN Real at 07:00hrs. Pt oriented to CDU & plan of care was discussed. Pt A&O x 4. Pt in CDU for MRI, neurocheck, and monitoring  . Pt denies any chest pain, SOB, dizziness or palpitations. V/S stable, pt afebrile,  IV in place, patent and free of signs of infiltration. Pt resting in bed. Safety & comfort measures maintained. Call bell in reach. Will continue to monitor.

## 2023-08-02 NOTE — ED CDU PROVIDER DISPOSITION NOTE - NSFOLLOWUPINSTRUCTIONS_ED_ALL_ED_FT
1. stay hydrated.  2. continue current home medications. Take Tylenol 650mg every 6hrs for pain as needed.   Start the following:  - Recommend artificial tears 4x/day in both eyes   - Recommend lacrilube at bedtime in both eyes  - Recommend warm compresses 2-3x/day  3. Follow up with your PCP in1 -2 days.  4. Follow up with Neurology Dr. Coy #[phone] in 2-3 days. he can send you for echocardiogram with bubble study outpatient as well as an EEG.   you can also follow up with concussion  clinic [facility] concussion center ([phone]) within 1 week.    Follow up with Neurosurgeon Dr. Posada within 1-2 weeks for brain aneurysm found on imaging. results given to you please review with the doctors.     please follow up with spine Dr. Oliveira for abnormalities in spine  Celestine Oliveira  Orthopaedic Surgery  [address] [place] [address], [address]  [place] [address]  Phone: [phone]  Fax: [phone]    follow up with Ophthalmology within 1 week.  [address] [place] [address]. [address]  [place] [address]  [phone]    5. Bring Printed Results to your Doctor Visits. Stroke Education given to you.  6. Return if symptoms worsen, fever, weakness, numbness, dizziness, vision change, slurred speech and all other concerns    follow up the following with your doctors:  low hemoglobin/hematocrit (anemia)  low hdl (good cholesterol, . eat healthy  on CT angio imaging: 3 x 3 mm aneurysm off the proximal right A2 segment.  on MRI: MRI BRAIN : "Chronic infarcts left corona radiata and ipsilateral centrum semiovale   ovale. Bihemispheric altered white matter signal; a nonspecific finding which statistically reflects chronic microvascular ischemic change." MRI CERVICAL SPINE "1. Multilevel malalignment with straightening of lordosis; the latter of which can be seen in the setting of muscle spasm. 2. C5-C6 disc bulge-spondylitic ridge complex, impinging upon the ventral cord, resulting in mild central canal and severe bilateral neural foramen stenosis with uncovertebral spurring. 3. C6-C7 disc bulge, resulting in mild central canal and severe right neural foramen stenosis with uncovertebral spurring."    review all results with your doctors    Stroke Prevention    AMBULATORY CARE:    Stroke prevention includes making lifestyle changes and managing health conditions that can lead to a stroke. Your healthcare provider can help you create a plan that will be specific to your needs.    Know your risk for a stroke: You cannot control some risk factors. Examples are being older than 55 or , or having a family history of stroke. You can take action for any of the following risk factors:   •A personal history of transient ischemic attack (TIA)      •Diabetes or high cholesterol      •Atrial fibrillation, high blood pressure, or heart disease      •Smoking cigarettes, drinking alcohol, or using drugs such as cocaine      •Obesity or not enough physical activity      •Taking birth control pills, especially in women older than 35 who smoke cigarettes      Medicines to help prevent a stroke depend on your stroke risk factors. Your healthcare provider may recommend any of the following:  •Blood thinners help prevent blood clots. Clots can cause strokes, heart attacks, and death. The following are general safety guidelines to follow while you are taking a blood thinner:?Watch for bleeding and bruising while you take blood thinners. Watch for bleeding from your gums or nose. Watch for blood in your urine and bowel movements. Use a soft washcloth on your skin, and a soft toothbrush to brush your teeth. This can keep your skin and gums from bleeding. If you shave, use an electric shaver. Do not play contact sports.       ?Tell your dentist and other healthcare providers that you take a blood thinner. Wear a bracelet or necklace that says you take this medicine.       ?Do not start or stop any other medicines unless your healthcare provider tells you to. Many medicines cannot be used with blood thinners.      ?Take your blood thinner exactly as prescribed by your healthcare provider. Do not skip does or take less than prescribed. Tell your provider right away if you forget to take your blood thinner, or if you take too much.      ?Warfarin is a blood thinner that you may need to take. The following are things you should be aware of if you take warfarin: ?Foods and medicines can affect the amount of warfarin in your blood. Do not make major changes to your diet while you take warfarin. Warfarin works best when you eat about the same amount of vitamin K every day. Vitamin K is found in green leafy vegetables and certain other foods. Ask for more information about what to eat when you are taking warfarin.      ?You will need to see your healthcare provider for follow-up visits when you are on warfarin. You will need regular blood tests. These tests are used to decide how much medicine you need.         •Blood pressure (BP) medicines may be given to help control hypertension. Antihypertensives can help lower your BP and keep it within your recommended range.      •Diuretics help decrease extra fluid that collects in your body. This helps lower your BP.       •Medicine may also help lower your cholesterol level. A low cholesterol level helps prevent heart disease and makes it easier to control your blood pressure.      •Low-dose aspirin may be recommended to prevent blood clots. Your healthcare provider will tell you if you should take aspirin, and how much to take each day.      Lifestyle changes that can help prevent a stroke:   Prevent Heart Disease        •Stay active. Aim to get physical activity for 30 minutes a day, on most days of the week. You can break activity into 10 minute periods, 3 times during the day. Activity can lower your risk for problems that can lead to a stroke. Activity can control you blood pressure, cholesterol, weight, and blood sugar levels. Find an exercise that you enjoy. This will make it easier for you to reach your exercise goals.  Ways to Be Physically Active       Strength Training for Adults           •Limit sodium (salt) as directed. Too much sodium can affect your fluid balance. Check labels to find low-sodium or no-salt-added foods. Some low-sodium foods use potassium salts for flavor. Too much potassium can also cause health problems. Your healthcare provider will tell you how much sodium and potassium are safe for you to have in a day. He or she may recommend that you limit sodium to 2,300 mg a day.             •Follow the meal plan recommended by your healthcare provider. A dietitian or your provider can give you more information on low-sodium plans or the DASH (Dietary Approaches to Stop Hypertension) eating plan. The DASH plan is low in sodium, processed sugar, unhealthy fats, and total fat. It is high in potassium, calcium, and fiber. These can be found in vegetables, fruit, and whole-grain foods.             •Maintain a healthy weight. Being overweight or obese can increase your risk for a stroke. Ask your healthcare provider what a healthy weight is for you. Ask him or her to help you create a weight loss plan if you are overweight. He or she can help you create small goals if you have a lot of weight to lose.  Weight Checks KEVYN           •Talk to your healthcare provider about alcohol. Alcohol can raise your blood pressure. The recommended limit is 2 drinks in a day for men and 1 drink in a day for women. Do not binge drink or save a week's worth of alcohol to drink in 1 or 2 days. Limit weekly amounts as directed by your provider.      •Do not smoke. Nicotine and other chemicals in cigarettes and cigars can cause lung and heart damage. Heart and lung damage can increase your risk for a stroke. Ask your healthcare provider for information if you currently smoke and need help to quit. E-cigarettes or smokeless tobacco still contain nicotine. Talk to your healthcare provider before you use these products.      •Do not use illegal drugs. Cocaine and other illegal drugs can cause a stroke. Talk to your healthcare provider if you currently use illegal drugs and need help to quit.      •Manage stress. Stress can raise your blood pressure. Find ways to relax, such as deep breathing or listening to music.      Manage health conditions that can lead to a stroke:   •Manage your blood pressure (BP): ?Get regular BP screening. Screening can help find high BP early to lower your risk for a stroke. Your healthcare provider will give you directions to lower and control your BP.  Blood Pressure Readings           ?Check your BP as directed. You can monitor your blood pressure at home. Ask your healthcare provider how often to check your blood pressure and what your blood pressure should be. Tell your healthcare provider if your blood pressure is higher than what he or she says it should be. He or she may need to change your medicine or help you make changes to your nutrition or exercise plan.   How to take a Blood Pressure           •Manage diabetes. Good control of your blood sugar levels may decrease your risk for a stroke. If you take diabetes medicine or insulin, take it as directed. Healthy foods and regular exercise also help lower your blood sugar levels. Monitor your levels as directed. Keep a record of your blood sugar levels and bring them to your appointments. This will help your healthcare provider make changes to your medicines. It may also help you find ways to get better control of your diabetes.  How to check your blood sugar           •Manage sleep apnea. Sleep apnea can cause stroke risk factors, such as high blood pressure, heart failure, or heart rhythm problems. Get screened and treated for sleep apnea. Talk to your healthcare provider about devices that help prevent complications from sleep apnea. You may need to use a CPAP or BiPAP machine while you sleep. These machines increase your oxygen levels and keep your airway open.  CPAP           •Manage other medical conditions that increase your risk for a stroke. Atrial fibrillation (a-fib) is an abnormal heart rhythm that can cause blood clots. Medicines or procedures may be used to control a-fib. Patent foramen ovale (PFO) can also lead to a stroke. Your healthcare provider may recommend you have surgery to close a PFO, if needed. Sickle cell disease, or sickle cell anemia, can cause blockages in blood vessels. The blockages may need to be removed during surgery. Depression and anxiety can stress your heart. Stress may lead to high blood pressure or heart disease. Talk to your healthcare provider about treatment for depression or anxiety.      •Talk to your healthcare provider about risk factors for women. Birth control pills increase your risk, especially if you are older than 35 or smoke cigarettes. Talk to your healthcare provider about other forms of contraception. Estrogen levels drop during menopause. Low estrogen levels may increase your risk for stroke. Talk to your healthcare provider about hormone replacement therapy to reduce your risk for a stroke.      Follow up with your doctor or neurologist as directed: You may need a CT or MRI of your brain to check for problems that may cause a stroke. Write down your questions so you remember to ask them during your visits.       © Copyright [employer] 2022           back to top                          © Copyright [employer] 2022
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
## 2023-08-10 ENCOUNTER — NON-APPOINTMENT (OUTPATIENT)
Age: 63
End: 2023-08-10

## 2023-08-11 ENCOUNTER — APPOINTMENT (OUTPATIENT)
Dept: ORTHOPEDIC SURGERY | Facility: CLINIC | Age: 63
End: 2023-08-11
Payer: COMMERCIAL

## 2023-08-11 ENCOUNTER — NON-APPOINTMENT (OUTPATIENT)
Age: 63
End: 2023-08-11

## 2023-08-11 VITALS
BODY MASS INDEX: 37.49 KG/M2 | SYSTOLIC BLOOD PRESSURE: 163 MMHG | DIASTOLIC BLOOD PRESSURE: 93 MMHG | HEIGHT: 65 IN | WEIGHT: 225 LBS | OXYGEN SATURATION: 96 % | HEART RATE: 75 BPM

## 2023-08-11 DIAGNOSIS — M54.2 CERVICALGIA: ICD-10-CM

## 2023-08-11 PROCEDURE — 99214 OFFICE O/P EST MOD 30 MIN: CPT
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
## 2023-08-11 NOTE — ASSESSMENT
[FreeTextEntry1] : I had a lengthy discussion with the patient in regard to treatment plan and diagnosis. There are no red flag findings on imaging nor are there any red flag findings on clinical exams.  Therefore, we will proceed with a course of conservative treatment. This would include physical therapy/home exercise program, Tylenol, NSAIDs as medically indicated.  The patient will follow up with me in approximately 2 months.  I encouraged the patient to follow-up sooner if there are any new or worsening symptoms.

## 2023-08-11 NOTE — ADDENDUM
[FreeTextEntry1] : I, Lena Meadows, acted solely as a scribe for Dr. Celestine Oliveira MD on this date 08/11/2023    All medical record entries made by the Scribe were at my, Dr. Celestine Oliveira MD., direction and personally dictated by me on 08/11/2023 . I have reviewed the chart and agree that the record accurately reflects my personal performance of the history, physical exam, assessment and plan. I have also personally directed, reviewed, and agreed with the chart.
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
## 2023-08-11 NOTE — PHYSICAL EXAM
[de-identified] : Cervical Physical Exam       Gait - Normal       Station - Normal       Sagittal balance - Normal       Compensatory mechanism? - None       Horizontal gaze - Maintained       Heel walk - Normal       Toe walk - Normal       Reflexes   Biceps - Normal   Triceps - Normal   Brachioradialis - Normal   Patellar - Normal   Gastroc - Normal   Clonus -No       Hoffmans - None       Shoulder exam- normal           Spurlings - None       Wrist Pulses -2+ radial/ulnar       Foot Pulses -2+ DP/PT       Cervical range of motion - Normal       Sensation   C5-T1 sensation intact to light touch bilaterally       L1-S1 sensation intact to light touch bilaterally     Motor                 Deltoid     Bicep       Triceps    WF        WE           IO               Right       5/5 5/5 5/5 5/5 5/5 5/5            5/5 Left         5/5 5/5 5/5 5/5 5/5 5/5 5/5            IP   Quad   HS    TA   Gastroc   EHL Right 5/5 5/5 5/5 5/5 5/5 5/5 Left   5/5 5/5 5/5 5/5 5/5 5/5 [de-identified] : Cervical MRI reviewed  mild central canal stenosis at C5-C7  foraminal stenosis noted in the sub axial spine

## 2023-08-17 ENCOUNTER — APPOINTMENT (OUTPATIENT)
Dept: NEUROSURGERY | Facility: CLINIC | Age: 63
End: 2023-08-17
Payer: COMMERCIAL

## 2023-08-17 VITALS
DIASTOLIC BLOOD PRESSURE: 90 MMHG | BODY MASS INDEX: 37.49 KG/M2 | HEIGHT: 65 IN | WEIGHT: 225 LBS | HEART RATE: 56 BPM | SYSTOLIC BLOOD PRESSURE: 150 MMHG | OXYGEN SATURATION: 95 %

## 2023-08-17 PROCEDURE — 99214 OFFICE O/P EST MOD 30 MIN: CPT

## 2023-08-17 RX ORDER — CYCLOBENZAPRINE HYDROCHLORIDE 10 MG/1
10 TABLET, FILM COATED ORAL
Qty: 30 | Refills: 0 | Status: DISCONTINUED | COMMUNITY
Start: 2023-01-11 | End: 2023-08-17

## 2023-08-17 RX ORDER — PREDNISONE 10 MG/1
10 TABLET ORAL
Qty: 100 | Refills: 0 | Status: DISCONTINUED | COMMUNITY
Start: 2017-06-05 | End: 2023-08-17

## 2023-08-17 RX ORDER — METHYLPREDNISOLONE 4 MG/1
4 TABLET ORAL
Qty: 1 | Refills: 1 | Status: DISCONTINUED | COMMUNITY
Start: 2023-01-11 | End: 2023-08-17

## 2023-08-17 RX ORDER — CLARITHROMYCIN 500 MG/1
500 TABLET, FILM COATED ORAL
Qty: 20 | Refills: 0 | Status: DISCONTINUED | COMMUNITY
Start: 2017-06-05 | End: 2023-08-17

## 2023-08-17 RX ORDER — METHYLPREDNISOLONE 4 MG/1
4 TABLET ORAL
Qty: 1 | Refills: 0 | Status: DISCONTINUED | COMMUNITY
Start: 2023-02-01 | End: 2023-08-17

## 2023-09-01 NOTE — ASSESSMENT
[FreeTextEntry1] : Impression: 63yr old female with cta head showing 3mm right a 2 aneurysm   Plan: Discussed risk of aneurysm rupture based on size and location Educated patient on signs and symptoms of subarachnoid hemorrhage and should they experience worst headache of life will seek medical attention immediately. Recommend formal cerebral angiogram to further evaluate the aneurysm The risks, benefits, alternative, complications and personnel associated with the procedure were discussed with the patient and family in great detail.  They request that we proceed. 10/17/2023 ok to stay on xarelto for the angiogram Hold date for angio embo 11/7/2023 ( cardiac clearance before this date) Ok to participate in physical therapy for her neck
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
## 2023-09-01 NOTE — REVIEW OF SYSTEMS
[As Noted in HPI] : as noted in HPI [Dizziness] : dizziness [Fainting] : fainting [Negative] : Heme/Lymph [de-identified] : off balance, headaches.

## 2023-09-21 ENCOUNTER — TRANSCRIPTION ENCOUNTER (OUTPATIENT)
Age: 63
End: 2023-09-21

## 2023-09-29 ENCOUNTER — TRANSCRIPTION ENCOUNTER (OUTPATIENT)
Age: 63
End: 2023-09-29

## 2023-10-06 ENCOUNTER — APPOINTMENT (OUTPATIENT)
Dept: CV DIAGNOSTICS | Facility: HOSPITAL | Age: 63
End: 2023-10-06

## 2023-10-06 ENCOUNTER — OUTPATIENT (OUTPATIENT)
Dept: OUTPATIENT SERVICES | Facility: HOSPITAL | Age: 63
LOS: 1 days | End: 2023-10-06
Payer: COMMERCIAL

## 2023-10-06 DIAGNOSIS — I50.22 CHRONIC SYSTOLIC (CONGESTIVE) HEART FAILURE: ICD-10-CM

## 2023-10-06 PROCEDURE — 93016 CV STRESS TEST SUPVJ ONLY: CPT | Mod: GC,MH

## 2023-10-06 PROCEDURE — 78451 HT MUSCLE IMAGE SPECT SING: CPT | Mod: 26,MH

## 2023-10-06 PROCEDURE — 93018 CV STRESS TEST I&R ONLY: CPT | Mod: GC,MH

## 2023-10-09 ENCOUNTER — OUTPATIENT (OUTPATIENT)
Dept: OUTPATIENT SERVICES | Facility: HOSPITAL | Age: 63
LOS: 1 days | End: 2023-10-09
Payer: COMMERCIAL

## 2023-10-09 VITALS
WEIGHT: 220.02 LBS | TEMPERATURE: 98 F | HEART RATE: 74 BPM | OXYGEN SATURATION: 96 % | DIASTOLIC BLOOD PRESSURE: 84 MMHG | SYSTOLIC BLOOD PRESSURE: 134 MMHG | RESPIRATION RATE: 18 BRPM | HEIGHT: 65 IN

## 2023-10-09 DIAGNOSIS — Z98.890 OTHER SPECIFIED POSTPROCEDURAL STATES: Chronic | ICD-10-CM

## 2023-10-09 DIAGNOSIS — Z01.818 ENCOUNTER FOR OTHER PREPROCEDURAL EXAMINATION: ICD-10-CM

## 2023-10-09 DIAGNOSIS — Z90.710 ACQUIRED ABSENCE OF BOTH CERVIX AND UTERUS: Chronic | ICD-10-CM

## 2023-10-09 DIAGNOSIS — I67.1 CEREBRAL ANEURYSM, NONRUPTURED: ICD-10-CM

## 2023-10-09 DIAGNOSIS — D68.61 ANTIPHOSPHOLIPID SYNDROME: ICD-10-CM

## 2023-10-09 LAB
ANION GAP SERPL CALC-SCNC: 13 MMOL/L — SIGNIFICANT CHANGE UP (ref 5–17)
BLD GP AB SCN SERPL QL: NEGATIVE — SIGNIFICANT CHANGE UP
BUN SERPL-MCNC: 13 MG/DL — SIGNIFICANT CHANGE UP (ref 7–23)
CALCIUM SERPL-MCNC: 9.4 MG/DL — SIGNIFICANT CHANGE UP (ref 8.4–10.5)
CHLORIDE SERPL-SCNC: 107 MMOL/L — SIGNIFICANT CHANGE UP (ref 96–108)
CO2 SERPL-SCNC: 23 MMOL/L — SIGNIFICANT CHANGE UP (ref 22–31)
CREAT SERPL-MCNC: 0.89 MG/DL — SIGNIFICANT CHANGE UP (ref 0.5–1.3)
EGFR: 73 ML/MIN/1.73M2 — SIGNIFICANT CHANGE UP
GLUCOSE SERPL-MCNC: 104 MG/DL — HIGH (ref 70–99)
HCT VFR BLD CALC: 36.8 % — SIGNIFICANT CHANGE UP (ref 34.5–45)
HGB BLD-MCNC: 11.6 G/DL — SIGNIFICANT CHANGE UP (ref 11.5–15.5)
MCHC RBC-ENTMCNC: 25.1 PG — LOW (ref 27–34)
MCHC RBC-ENTMCNC: 31.5 GM/DL — LOW (ref 32–36)
MCV RBC AUTO: 79.5 FL — LOW (ref 80–100)
NRBC # BLD: 0 /100 WBCS — SIGNIFICANT CHANGE UP (ref 0–0)
PLATELET # BLD AUTO: 165 K/UL — SIGNIFICANT CHANGE UP (ref 150–400)
PLATELET RESPONSE ASPIRIN RESULT: 511 ARU — SIGNIFICANT CHANGE UP (ref 350–700)
POTASSIUM SERPL-MCNC: 3.7 MMOL/L — SIGNIFICANT CHANGE UP (ref 3.5–5.3)
POTASSIUM SERPL-SCNC: 3.7 MMOL/L — SIGNIFICANT CHANGE UP (ref 3.5–5.3)
RBC # BLD: 4.63 M/UL — SIGNIFICANT CHANGE UP (ref 3.8–5.2)
RBC # FLD: 16.6 % — HIGH (ref 10.3–14.5)
RH IG SCN BLD-IMP: POSITIVE — SIGNIFICANT CHANGE UP
SODIUM SERPL-SCNC: 143 MMOL/L — SIGNIFICANT CHANGE UP (ref 135–145)
WBC # BLD: 4.65 K/UL — SIGNIFICANT CHANGE UP (ref 3.8–10.5)
WBC # FLD AUTO: 4.65 K/UL — SIGNIFICANT CHANGE UP (ref 3.8–10.5)

## 2023-10-09 PROCEDURE — 80048 BASIC METABOLIC PNL TOTAL CA: CPT

## 2023-10-09 PROCEDURE — 36415 COLL VENOUS BLD VENIPUNCTURE: CPT

## 2023-10-09 PROCEDURE — 86901 BLOOD TYPING SEROLOGIC RH(D): CPT

## 2023-10-09 PROCEDURE — 85027 COMPLETE CBC AUTOMATED: CPT

## 2023-10-09 PROCEDURE — G0463: CPT

## 2023-10-09 PROCEDURE — 86900 BLOOD TYPING SEROLOGIC ABO: CPT

## 2023-10-09 PROCEDURE — 86850 RBC ANTIBODY SCREEN: CPT

## 2023-10-09 PROCEDURE — 85576 BLOOD PLATELET AGGREGATION: CPT

## 2023-10-09 RX ORDER — ALBUTEROL 90 UG/1
2 AEROSOL, METERED ORAL
Qty: 0 | Refills: 0 | DISCHARGE

## 2023-10-09 RX ORDER — TOPIRAMATE 25 MG
150 TABLET ORAL
Qty: 0 | Refills: 0 | DISCHARGE

## 2023-10-09 RX ORDER — MOMETASONE FUROATE AND FORMOTEROL FUMARATE DIHYDRATE 200; 5 UG/1; UG/1
2 AEROSOL RESPIRATORY (INHALATION)
Qty: 0 | Refills: 0 | DISCHARGE
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
## 2023-10-09 NOTE — H&P PST ADULT - NSICDXPASTMEDICALHX_GEN_ALL_CORE_FT
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

## 2023-10-09 NOTE — H&P PST ADULT - PRIMARY CARE PROVIDER
Luke- [phone]- Appointment 10/16 Luke- [phone]- Appointment 10/16 Luke- [phone]- Appointment 10/16

## 2023-10-09 NOTE — H&P PST ADULT - PROBLEM SELECTOR PLAN 1
Plan for cerebral angiogram on 10/17/23 with Dr. Posada.    PST labs sent per protocol  Pre procedure surgical scrub instructions discussed  Pre-op education provided - all questions answered   Continue Xarelto (takes QHS) and aspirin Plan for cerebral angiogram on 10/17/23 with Dr. Posada.    PST labs sent per protocol  Pre procedure surgical scrub instructions discussed  Pre-op education provided - all questions answered

## 2023-10-09 NOTE — H&P PST ADULT - LAST STRESS TEST
10/6/23 The left ventricle is normal in function and normal in size. The stress left ventricular EF% is 62 %. The stress end diastolic volume is 80 ml and systolic volume is 30 ml. There is normal right ventricular function.

## 2023-10-09 NOTE — H&P PST ADULT - NSICDXPASTSURGICALHX_GEN_ALL_CORE_FT
PAST SURGICAL HISTORY:  Dysphonia gel injection into vocal cord 1/11    H/O foot surgery     H/O shoulder surgery     Mass benign mas removed between heart and spine, 8/07    S/P Dilatation and Curettage 9/09, uterine ablation    S/P hysterectomy     S/P Tonsillectomy and Adenoidectomy     Status Post Umbilical Hernia Repair, Follow-Up Exam 10/05    Tubal Ligation 1995

## 2023-10-09 NOTE — H&P PST ADULT - ASSESSMENT
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

## 2023-10-13 ENCOUNTER — APPOINTMENT (OUTPATIENT)
Dept: ORTHOPEDIC SURGERY | Facility: CLINIC | Age: 63
End: 2023-10-13

## 2023-10-17 ENCOUNTER — RESULT REVIEW (OUTPATIENT)
Age: 63
End: 2023-10-17

## 2023-10-17 ENCOUNTER — APPOINTMENT (OUTPATIENT)
Dept: NEUROSURGERY | Facility: HOSPITAL | Age: 63
End: 2023-10-17

## 2023-10-17 ENCOUNTER — TRANSCRIPTION ENCOUNTER (OUTPATIENT)
Age: 63
End: 2023-10-17

## 2023-10-17 ENCOUNTER — OUTPATIENT (OUTPATIENT)
Dept: OUTPATIENT SERVICES | Facility: HOSPITAL | Age: 63
LOS: 1 days | End: 2023-10-17
Payer: COMMERCIAL

## 2023-10-17 VITALS
DIASTOLIC BLOOD PRESSURE: 78 MMHG | RESPIRATION RATE: 16 BRPM | HEART RATE: 57 BPM | OXYGEN SATURATION: 94 % | SYSTOLIC BLOOD PRESSURE: 163 MMHG

## 2023-10-17 VITALS
SYSTOLIC BLOOD PRESSURE: 153 MMHG | TEMPERATURE: 98 F | RESPIRATION RATE: 16 BRPM | HEART RATE: 64 BPM | HEIGHT: 65 IN | DIASTOLIC BLOOD PRESSURE: 92 MMHG | WEIGHT: 225.09 LBS | OXYGEN SATURATION: 97 %

## 2023-10-17 DIAGNOSIS — Z98.890 OTHER SPECIFIED POSTPROCEDURAL STATES: Chronic | ICD-10-CM

## 2023-10-17 DIAGNOSIS — I67.1 CEREBRAL ANEURYSM, NONRUPTURED: ICD-10-CM

## 2023-10-17 DIAGNOSIS — Z90.710 ACQUIRED ABSENCE OF BOTH CERVIX AND UTERUS: Chronic | ICD-10-CM

## 2023-10-17 PROCEDURE — C1887: CPT

## 2023-10-17 PROCEDURE — C1760: CPT

## 2023-10-17 PROCEDURE — 36226 PLACE CATH VERTEBRAL ART: CPT

## 2023-10-17 PROCEDURE — 36227 PLACE CATH XTRNL CAROTID: CPT

## 2023-10-17 PROCEDURE — 36227 PLACE CATH XTRNL CAROTID: CPT | Mod: 50

## 2023-10-17 PROCEDURE — C1894: CPT

## 2023-10-17 PROCEDURE — 76377 3D RENDER W/INTRP POSTPROCES: CPT | Mod: 26

## 2023-10-17 PROCEDURE — 36224 PLACE CATH CAROTD ART: CPT

## 2023-10-17 PROCEDURE — C1769: CPT

## 2023-10-17 PROCEDURE — 36224 PLACE CATH CAROTD ART: CPT | Mod: 50

## 2023-10-17 PROCEDURE — 36226 PLACE CATH VERTEBRAL ART: CPT | Mod: 50

## 2023-10-17 RX ORDER — SODIUM CHLORIDE 9 MG/ML
1000 INJECTION INTRAMUSCULAR; INTRAVENOUS; SUBCUTANEOUS
Refills: 0 | Status: DISCONTINUED | OUTPATIENT
Start: 2023-10-17 | End: 2023-10-31

## 2023-10-17 NOTE — ASU PATIENT PROFILE, ADULT - FALL HARM RISK - RISK INTERVENTIONS
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
## 2023-10-17 NOTE — CHART NOTE - NSCHARTNOTEFT_GEN_A_CORE
Interventional Neuro- Radiology   Procedure Note PA-ZAFAR    Procedure: Selective Cerebral Angiography   Pre- Procedure Diagnosis:  Post- Procedure Diagnosis:    : Dr Kilo Posada  Fellow:    Dr Cresencio Rodriguez   Physician Assistant: Leann Nelson PA-C    Nurse:  Radiologic Tech:  Anesthesiologist:  Sheath:      I/Os: EBL less than 10cc  IV fluids:     cc Urine output     cc  Contrast Omnipaque 240      cc             Vitals: BP         HR      Spo2 100%          Preliminary Report:  Using a 5 Swedish long sheath to the right groin under MAC sedation via left vertebral artery, left internal carotid artery, left external carotid artery, right vertebral artery, right internal carotid artery, right external carotid artery a selective cerebral angiography was performed and demonstrated                       Official note to follow.  Patient tolerated procedure well, hemodynamically stable, no change in neurological status compared to baseline. Results discussed with patient and patient's . Right groin sheath was removed, manual compression held to hemostasis for 20 minutes, no active bleeding, no hematoma, quick clot and safeguard balloon dressing applied at Interventional Neuro- Radiology   Procedure Note PA-C    Procedure: Selective Cerebral Angiography   Pre- Procedure Diagnosis:  Post- Procedure Diagnosis:    : Dr Kilo Posada  Fellow:    Dr Cresencio Rodriguez   Physician Assistant: Leann Nelson PA-C    Nurse:                   Ya Concepcion RN  Radiologic Tech:   Nick Garnett LRT   Anesthesiologist:  Dr Heladio Heaton   Sheath:                 5 [place] arrow 65cm       I/Os: EBL less than 10cc  IV fluids:     cc Urine due to void  Contrast 270       cc             Vitals: /70       HR 55     Spo2 100%          Preliminary Report:  Using a 5 [place] arrow 65cm sheath to the right groin under MAC sedation via left vertebral artery, left internal carotid artery, left external carotid artery, right vertebral artery, right internal carotid artery, right external carotid artery a selective cerebral angiography was performed and demonstrated                       Official note to follow.  Patient tolerated procedure well, hemodynamically stable, no change in neurological status compared to baseline. Results discussed with patient and patient's . Right groin sheath was removed, a vascade device and manual compression held to hemostasis for 20 minutes, no active bleeding, no hematoma, quick clot and safeguard balloon dressing applied at Interventional Neuro- Radiology   Procedure Note PA-C    Procedure: Selective Cerebral Angiography   Pre- Procedure Diagnosis:  Post- Procedure Diagnosis:    : Dr Kilo Posada  Fellow:     Dr Viktor Da Silva   Fellow:    Dr Cresencio Rodriguez   Physician Assistant: Leann Nelson PA-C  Resident:                  Dr Gorge Hamlin     Nurse:                   Ya Concepcion RN  Radiologic Tech:   Nick Garnett LRT   Anesthesiologist:  Dr Heladio Heaton   Sheath:                 5 Croatian arrow 65cm       I/Os: EBL less than 10cc  IV fluids: 100cc Urine due to void  Contrast 270                  Vitals: /70       HR 55     Spo2 100%          Preliminary Report:  Using a 5 Croatian arrow 65cm sheath to the right groin under MAC sedation via left vertebral artery, left internal carotid artery, left external carotid artery, right vertebral artery, right internal carotid artery, right external carotid artery a selective cerebral angiography was performed and demonstrated                       Official note to follow.  Patient tolerated procedure well, hemodynamically stable, no change in neurological status compared to baseline. Results discussed with patient and patient's . Right groin sheath was removed, a vascade device and manual compression held to hemostasis for 20 minutes, no active bleeding, no hematoma, quick clot and safeguard balloon dressing applied at Interventional Neuro- Radiology   Procedure Note PA-C    Procedure: Selective Cerebral Angiography   Pre- Procedure Diagnosis: A2 aneurysm   Post- Procedure Diagnosis:    : Dr Kilo Posada  Fellow:     Dr Viktor Da Silva   Fellow:    Dr Cresencio Rodriguez   Physician Assistant: Leann Nelson PA-C  Resident:                  Dr Gorge Hamlin     Nurse:                   Ya Concepcion RN  Radiologic Tech:   Nick Garnett LRT   Anesthesiologist:  Dr Heladio Heaton   Sheath:                 5 Vietnamese arrow 65cm       I/Os: EBL less than 10cc  IV fluids: 100cc Urine due to void  Contrast 270                  Vitals: /70       HR 55     Spo2 100%          Preliminary Report:  Using a 5 Vietnamese arrow 65cm sheath to the right groin under MAC sedation via left vertebral artery, left internal carotid artery, left external carotid artery, right vertebral artery, right internal carotid artery, right external carotid artery a selective cerebral angiography was performed and demonstrated an A2 aneurysm. Official note to follow.  Patient tolerated procedure well, hemodynamically stable, no change in neurological status compared to baseline. Results discussed with patient and patient's . Right groin sheath was removed, a vascade device and manual compression held to hemostasis for 20 minutes, no active bleeding, no hematoma, quick clot and safeguard balloon dressing applied. Interventional Neuro- Radiology   Procedure Note PA-C    Procedure: Selective Cerebral Angiography   Pre- Procedure Diagnosis: A2 aneurysm   Post- Procedure Diagnosis:    : Dr Kilo Posada  Fellow:     Dr Viktor Da Silva   Fellow:    Dr Cresencio Rodriguez   Physician Assistant: Leann Nelson PA-C  Resident:                  Dr Gorge Hamlin     Nurse:                   Ya Concepcion RN  Radiologic Tech:   Nick Garnett LRT   Anesthesiologist:  Dr Heladio Heaton   Sheath:                 5 Puerto Rican arrow 65cm       I/Os: EBL less than 10cc  IV fluids: 100cc Urine due to void  Contrast 270  97cc                Vitals: /70       HR 55     Spo2 100%          Preliminary Report:  Using a 5 [place]ican arrow 65cm sheath to the right groin under MAC sedation via left vertebral artery, left internal carotid artery, left external carotid artery, right vertebral artery, right internal carotid artery, right external carotid artery a selective cerebral angiography was performed and demonstrated an A2 aneurysm. Official note to follow.  Patient tolerated procedure well, hemodynamically stable, no change in neurological status compared to baseline. Results discussed with patient and patient's . Right groin sheath was removed, a vascade device and manual compression held to hemostasis for 20 minutes, no active bleeding, no hematoma, quick clot and safeguard balloon dressing applied at 1330 hours. Disposition recovery room 2nd floor. Discharge home at 1545 hours.

## 2023-10-24 PROBLEM — I67.1 CEREBRAL ANEURYSM, NONRUPTURED: Chronic | Status: ACTIVE | Noted: 2023-10-09

## 2023-10-24 PROBLEM — U07.1 COVID-19: Chronic | Status: ACTIVE | Noted: 2023-10-09

## 2023-10-24 PROBLEM — Z86.59 PERSONAL HISTORY OF OTHER MENTAL AND BEHAVIORAL DISORDERS: Chronic | Status: ACTIVE | Noted: 2023-10-09

## 2023-11-01 ENCOUNTER — TRANSCRIPTION ENCOUNTER (OUTPATIENT)
Age: 63
End: 2023-11-01

## 2023-11-02 ENCOUNTER — APPOINTMENT (OUTPATIENT)
Dept: NEUROSURGERY | Facility: CLINIC | Age: 63
End: 2023-11-02
Payer: COMMERCIAL

## 2023-11-02 PROCEDURE — 99442: CPT

## 2023-11-03 RX ORDER — ASPIRIN 325 MG/1
325 TABLET, FILM COATED ORAL DAILY
Qty: 30 | Refills: 11 | Status: ACTIVE | COMMUNITY
Start: 2023-11-03 | End: 1900-01-01

## 2023-11-07 ENCOUNTER — OUTPATIENT (OUTPATIENT)
Dept: OUTPATIENT SERVICES | Facility: HOSPITAL | Age: 63
LOS: 1 days | End: 2023-11-07
Payer: COMMERCIAL

## 2023-11-07 VITALS
TEMPERATURE: 98 F | HEIGHT: 65 IN | DIASTOLIC BLOOD PRESSURE: 85 MMHG | WEIGHT: 220.02 LBS | OXYGEN SATURATION: 96 % | SYSTOLIC BLOOD PRESSURE: 122 MMHG | RESPIRATION RATE: 14 BRPM | HEART RATE: 66 BPM

## 2023-11-07 DIAGNOSIS — Z98.890 OTHER SPECIFIED POSTPROCEDURAL STATES: Chronic | ICD-10-CM

## 2023-11-07 DIAGNOSIS — Z90.710 ACQUIRED ABSENCE OF BOTH CERVIX AND UTERUS: Chronic | ICD-10-CM

## 2023-11-07 DIAGNOSIS — I67.1 CEREBRAL ANEURYSM, NONRUPTURED: ICD-10-CM

## 2023-11-07 DIAGNOSIS — Z01.818 ENCOUNTER FOR OTHER PREPROCEDURAL EXAMINATION: ICD-10-CM

## 2023-11-07 LAB
ANION GAP SERPL CALC-SCNC: 13 MMOL/L — SIGNIFICANT CHANGE UP (ref 5–17)
ANION GAP SERPL CALC-SCNC: 13 MMOL/L — SIGNIFICANT CHANGE UP (ref 5–17)
BLD GP AB SCN SERPL QL: NEGATIVE — SIGNIFICANT CHANGE UP
BLD GP AB SCN SERPL QL: NEGATIVE — SIGNIFICANT CHANGE UP
BUN SERPL-MCNC: 17 MG/DL — SIGNIFICANT CHANGE UP (ref 7–23)
BUN SERPL-MCNC: 17 MG/DL — SIGNIFICANT CHANGE UP (ref 7–23)
CALCIUM SERPL-MCNC: 10 MG/DL — SIGNIFICANT CHANGE UP (ref 8.4–10.5)
CALCIUM SERPL-MCNC: 10 MG/DL — SIGNIFICANT CHANGE UP (ref 8.4–10.5)
CHLORIDE SERPL-SCNC: 104 MMOL/L — SIGNIFICANT CHANGE UP (ref 96–108)
CHLORIDE SERPL-SCNC: 104 MMOL/L — SIGNIFICANT CHANGE UP (ref 96–108)
CO2 SERPL-SCNC: 23 MMOL/L — SIGNIFICANT CHANGE UP (ref 22–31)
CO2 SERPL-SCNC: 23 MMOL/L — SIGNIFICANT CHANGE UP (ref 22–31)
CREAT SERPL-MCNC: 0.96 MG/DL — SIGNIFICANT CHANGE UP (ref 0.5–1.3)
CREAT SERPL-MCNC: 0.96 MG/DL — SIGNIFICANT CHANGE UP (ref 0.5–1.3)
EGFR: 66 ML/MIN/1.73M2 — SIGNIFICANT CHANGE UP
EGFR: 66 ML/MIN/1.73M2 — SIGNIFICANT CHANGE UP
GLUCOSE SERPL-MCNC: 129 MG/DL — HIGH (ref 70–99)
GLUCOSE SERPL-MCNC: 129 MG/DL — HIGH (ref 70–99)
HCT VFR BLD CALC: 35 % — SIGNIFICANT CHANGE UP (ref 34.5–45)
HCT VFR BLD CALC: 35 % — SIGNIFICANT CHANGE UP (ref 34.5–45)
HGB BLD-MCNC: 10.9 G/DL — LOW (ref 11.5–15.5)
HGB BLD-MCNC: 10.9 G/DL — LOW (ref 11.5–15.5)
MCHC RBC-ENTMCNC: 24.4 PG — LOW (ref 27–34)
MCHC RBC-ENTMCNC: 24.4 PG — LOW (ref 27–34)
MCHC RBC-ENTMCNC: 31.1 GM/DL — LOW (ref 32–36)
MCHC RBC-ENTMCNC: 31.1 GM/DL — LOW (ref 32–36)
MCV RBC AUTO: 78.5 FL — LOW (ref 80–100)
MCV RBC AUTO: 78.5 FL — LOW (ref 80–100)
NRBC # BLD: 0 /100 WBCS — SIGNIFICANT CHANGE UP (ref 0–0)
NRBC # BLD: 0 /100 WBCS — SIGNIFICANT CHANGE UP (ref 0–0)
PA ADP PRP-ACNC: 276 PRU — SIGNIFICANT CHANGE UP (ref 194–417)
PA ADP PRP-ACNC: 276 PRU — SIGNIFICANT CHANGE UP (ref 194–417)
PLATELET # BLD AUTO: 219 K/UL — SIGNIFICANT CHANGE UP (ref 150–400)
PLATELET # BLD AUTO: 219 K/UL — SIGNIFICANT CHANGE UP (ref 150–400)
POTASSIUM SERPL-MCNC: 4.4 MMOL/L — SIGNIFICANT CHANGE UP (ref 3.5–5.3)
POTASSIUM SERPL-MCNC: 4.4 MMOL/L — SIGNIFICANT CHANGE UP (ref 3.5–5.3)
POTASSIUM SERPL-SCNC: 4.4 MMOL/L — SIGNIFICANT CHANGE UP (ref 3.5–5.3)
POTASSIUM SERPL-SCNC: 4.4 MMOL/L — SIGNIFICANT CHANGE UP (ref 3.5–5.3)
RBC # BLD: 4.46 M/UL — SIGNIFICANT CHANGE UP (ref 3.8–5.2)
RBC # BLD: 4.46 M/UL — SIGNIFICANT CHANGE UP (ref 3.8–5.2)
RBC # FLD: 15.5 % — HIGH (ref 10.3–14.5)
RBC # FLD: 15.5 % — HIGH (ref 10.3–14.5)
RH IG SCN BLD-IMP: POSITIVE — SIGNIFICANT CHANGE UP
RH IG SCN BLD-IMP: POSITIVE — SIGNIFICANT CHANGE UP
SODIUM SERPL-SCNC: 140 MMOL/L — SIGNIFICANT CHANGE UP (ref 135–145)
SODIUM SERPL-SCNC: 140 MMOL/L — SIGNIFICANT CHANGE UP (ref 135–145)
WBC # BLD: 4.44 K/UL — SIGNIFICANT CHANGE UP (ref 3.8–10.5)
WBC # BLD: 4.44 K/UL — SIGNIFICANT CHANGE UP (ref 3.8–10.5)
WBC # FLD AUTO: 4.44 K/UL — SIGNIFICANT CHANGE UP (ref 3.8–10.5)
WBC # FLD AUTO: 4.44 K/UL — SIGNIFICANT CHANGE UP (ref 3.8–10.5)

## 2023-11-07 PROCEDURE — 85576 BLOOD PLATELET AGGREGATION: CPT

## 2023-11-07 PROCEDURE — 86900 BLOOD TYPING SEROLOGIC ABO: CPT

## 2023-11-07 PROCEDURE — 80048 BASIC METABOLIC PNL TOTAL CA: CPT

## 2023-11-07 PROCEDURE — 86901 BLOOD TYPING SEROLOGIC RH(D): CPT

## 2023-11-07 PROCEDURE — 85027 COMPLETE CBC AUTOMATED: CPT

## 2023-11-07 PROCEDURE — 86850 RBC ANTIBODY SCREEN: CPT

## 2023-11-07 RX ORDER — CHOLECALCIFEROL (VITAMIN D3) 125 MCG
1 CAPSULE ORAL
Qty: 0 | Refills: 0 | DISCHARGE

## 2023-11-07 RX ORDER — LABETALOL HCL 100 MG
2 TABLET ORAL
Refills: 0 | DISCHARGE

## 2023-11-07 RX ORDER — GABAPENTIN 400 MG/1
1 CAPSULE ORAL
Refills: 0 | DISCHARGE

## 2023-11-07 NOTE — H&P PST ADULT - PROBLEM SELECTOR PLAN 1
Cerebral Angiogram & Embolization  -cbc, bmp, type & screen, ARU @ PST  -patient to start full dose aspirin and plavix on 11/14  -preop instructions provided  -continue A/C  -ABO on admit
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
## 2023-11-07 NOTE — H&P PST ADULT - NSICDXPASTSURGICALHX_GEN_ALL_CORE_FT
PAST SURGICAL HISTORY:  Dysphonia gel injection into vocal cord 1/11    H/O colonoscopy     H/O endoscopy     H/O foot surgery     H/O shoulder surgery     History of cerebral angiography     Mass benign mas removed between heart and spine, 8/07    S/P Dilatation and Curettage 9/09, uterine ablation    S/P foot surgery     S/P hysterectomy     S/P Tonsillectomy and Adenoidectomy     Status Post Umbilical Hernia Repair, Follow-Up Exam 10/05    Tubal Ligation 1995

## 2023-11-07 NOTE — H&P PST ADULT - OTHER CARE PROVIDERS
Cardiology-- Lala Mullins  // Pulmonary--Artem Payne [facility]-- Lala Mullins  // Pulmonary--Artem Payne [facility]-- Lala Mullins  // Pulmonary--Artem Payne [place]

## 2023-11-14 RX ORDER — ASPIRIN/CALCIUM CARB/MAGNESIUM 324 MG
1 TABLET ORAL
Refills: 0 | DISCHARGE
Start: 2023-11-14

## 2023-11-14 RX ORDER — CLOPIDOGREL BISULFATE 75 MG/1
1 TABLET, FILM COATED ORAL
Refills: 0 | DISCHARGE
Start: 2023-11-14

## 2023-11-21 ENCOUNTER — APPOINTMENT (OUTPATIENT)
Dept: NEUROSURGERY | Facility: HOSPITAL | Age: 63
End: 2023-11-21

## 2023-11-21 ENCOUNTER — INPATIENT (INPATIENT)
Facility: HOSPITAL | Age: 63
LOS: 0 days | Discharge: HOME | DRG: 26 | End: 2023-11-22
Attending: NEUROLOGICAL SURGERY | Admitting: NEUROLOGICAL SURGERY
Payer: COMMERCIAL

## 2023-11-21 VITALS
WEIGHT: 225.09 LBS | TEMPERATURE: 98 F | SYSTOLIC BLOOD PRESSURE: 140 MMHG | OXYGEN SATURATION: 95 % | HEIGHT: 65 IN | DIASTOLIC BLOOD PRESSURE: 90 MMHG | HEART RATE: 67 BPM

## 2023-11-21 DIAGNOSIS — Z98.890 OTHER SPECIFIED POSTPROCEDURAL STATES: Chronic | ICD-10-CM

## 2023-11-21 DIAGNOSIS — Z90.710 ACQUIRED ABSENCE OF BOTH CERVIX AND UTERUS: Chronic | ICD-10-CM

## 2023-11-21 DIAGNOSIS — I67.1 CEREBRAL ANEURYSM, NONRUPTURED: ICD-10-CM

## 2023-11-21 LAB
ANION GAP SERPL CALC-SCNC: 14 MMOL/L — SIGNIFICANT CHANGE UP (ref 5–17)
ANION GAP SERPL CALC-SCNC: 14 MMOL/L — SIGNIFICANT CHANGE UP (ref 5–17)
BASOPHILS # BLD AUTO: 0.03 K/UL — SIGNIFICANT CHANGE UP (ref 0–0.2)
BASOPHILS # BLD AUTO: 0.03 K/UL — SIGNIFICANT CHANGE UP (ref 0–0.2)
BASOPHILS NFR BLD AUTO: 0.5 % — SIGNIFICANT CHANGE UP (ref 0–2)
BASOPHILS NFR BLD AUTO: 0.5 % — SIGNIFICANT CHANGE UP (ref 0–2)
BUN SERPL-MCNC: 15 MG/DL — SIGNIFICANT CHANGE UP (ref 7–23)
BUN SERPL-MCNC: 15 MG/DL — SIGNIFICANT CHANGE UP (ref 7–23)
CALCIUM SERPL-MCNC: 8.5 MG/DL — SIGNIFICANT CHANGE UP (ref 8.4–10.5)
CALCIUM SERPL-MCNC: 8.5 MG/DL — SIGNIFICANT CHANGE UP (ref 8.4–10.5)
CHLORIDE SERPL-SCNC: 108 MMOL/L — SIGNIFICANT CHANGE UP (ref 96–108)
CHLORIDE SERPL-SCNC: 108 MMOL/L — SIGNIFICANT CHANGE UP (ref 96–108)
CO2 SERPL-SCNC: 22 MMOL/L — SIGNIFICANT CHANGE UP (ref 22–31)
CO2 SERPL-SCNC: 22 MMOL/L — SIGNIFICANT CHANGE UP (ref 22–31)
CREAT SERPL-MCNC: 0.83 MG/DL — SIGNIFICANT CHANGE UP (ref 0.5–1.3)
CREAT SERPL-MCNC: 0.83 MG/DL — SIGNIFICANT CHANGE UP (ref 0.5–1.3)
EGFR: 79 ML/MIN/1.73M2 — SIGNIFICANT CHANGE UP
EGFR: 79 ML/MIN/1.73M2 — SIGNIFICANT CHANGE UP
EOSINOPHIL # BLD AUTO: 0.06 K/UL — SIGNIFICANT CHANGE UP (ref 0–0.5)
EOSINOPHIL # BLD AUTO: 0.06 K/UL — SIGNIFICANT CHANGE UP (ref 0–0.5)
EOSINOPHIL NFR BLD AUTO: 1.1 % — SIGNIFICANT CHANGE UP (ref 0–6)
EOSINOPHIL NFR BLD AUTO: 1.1 % — SIGNIFICANT CHANGE UP (ref 0–6)
GLUCOSE SERPL-MCNC: 97 MG/DL — SIGNIFICANT CHANGE UP (ref 70–99)
GLUCOSE SERPL-MCNC: 97 MG/DL — SIGNIFICANT CHANGE UP (ref 70–99)
HCT VFR BLD CALC: 29.8 % — LOW (ref 34.5–45)
HCT VFR BLD CALC: 29.8 % — LOW (ref 34.5–45)
HGB BLD-MCNC: 9.2 G/DL — LOW (ref 11.5–15.5)
HGB BLD-MCNC: 9.2 G/DL — LOW (ref 11.5–15.5)
IMM GRANULOCYTES NFR BLD AUTO: 0.5 % — SIGNIFICANT CHANGE UP (ref 0–0.9)
IMM GRANULOCYTES NFR BLD AUTO: 0.5 % — SIGNIFICANT CHANGE UP (ref 0–0.9)
LYMPHOCYTES # BLD AUTO: 0.8 K/UL — LOW (ref 1–3.3)
LYMPHOCYTES # BLD AUTO: 0.8 K/UL — LOW (ref 1–3.3)
LYMPHOCYTES # BLD AUTO: 14.5 % — SIGNIFICANT CHANGE UP (ref 13–44)
LYMPHOCYTES # BLD AUTO: 14.5 % — SIGNIFICANT CHANGE UP (ref 13–44)
MAGNESIUM SERPL-MCNC: 1.5 MG/DL — LOW (ref 1.6–2.6)
MAGNESIUM SERPL-MCNC: 1.5 MG/DL — LOW (ref 1.6–2.6)
MCHC RBC-ENTMCNC: 24.4 PG — LOW (ref 27–34)
MCHC RBC-ENTMCNC: 24.4 PG — LOW (ref 27–34)
MCHC RBC-ENTMCNC: 30.9 GM/DL — LOW (ref 32–36)
MCHC RBC-ENTMCNC: 30.9 GM/DL — LOW (ref 32–36)
MCV RBC AUTO: 79 FL — LOW (ref 80–100)
MCV RBC AUTO: 79 FL — LOW (ref 80–100)
MONOCYTES # BLD AUTO: 0.25 K/UL — SIGNIFICANT CHANGE UP (ref 0–0.9)
MONOCYTES # BLD AUTO: 0.25 K/UL — SIGNIFICANT CHANGE UP (ref 0–0.9)
MONOCYTES NFR BLD AUTO: 4.5 % — SIGNIFICANT CHANGE UP (ref 2–14)
MONOCYTES NFR BLD AUTO: 4.5 % — SIGNIFICANT CHANGE UP (ref 2–14)
NEUTROPHILS # BLD AUTO: 4.33 K/UL — SIGNIFICANT CHANGE UP (ref 1.8–7.4)
NEUTROPHILS # BLD AUTO: 4.33 K/UL — SIGNIFICANT CHANGE UP (ref 1.8–7.4)
NEUTROPHILS NFR BLD AUTO: 78.9 % — HIGH (ref 43–77)
NEUTROPHILS NFR BLD AUTO: 78.9 % — HIGH (ref 43–77)
NRBC # BLD: 0 /100 WBCS — SIGNIFICANT CHANGE UP (ref 0–0)
NRBC # BLD: 0 /100 WBCS — SIGNIFICANT CHANGE UP (ref 0–0)
PA ADP PRP-ACNC: 259 PRU — SIGNIFICANT CHANGE UP (ref 194–417)
PA ADP PRP-ACNC: 259 PRU — SIGNIFICANT CHANGE UP (ref 194–417)
PA ADP PRP-ACNC: 80 PRU — LOW (ref 194–417)
PA ADP PRP-ACNC: 80 PRU — LOW (ref 194–417)
PHOSPHATE SERPL-MCNC: 3.9 MG/DL — SIGNIFICANT CHANGE UP (ref 2.5–4.5)
PHOSPHATE SERPL-MCNC: 3.9 MG/DL — SIGNIFICANT CHANGE UP (ref 2.5–4.5)
PLATELET # BLD AUTO: 149 K/UL — LOW (ref 150–400)
PLATELET # BLD AUTO: 149 K/UL — LOW (ref 150–400)
PLATELET RESPONSE ASPIRIN RESULT: 546 ARU — SIGNIFICANT CHANGE UP
PLATELET RESPONSE ASPIRIN RESULT: 546 ARU — SIGNIFICANT CHANGE UP
POTASSIUM SERPL-MCNC: 3.6 MMOL/L — SIGNIFICANT CHANGE UP (ref 3.5–5.3)
POTASSIUM SERPL-MCNC: 3.6 MMOL/L — SIGNIFICANT CHANGE UP (ref 3.5–5.3)
POTASSIUM SERPL-SCNC: 3.6 MMOL/L — SIGNIFICANT CHANGE UP (ref 3.5–5.3)
POTASSIUM SERPL-SCNC: 3.6 MMOL/L — SIGNIFICANT CHANGE UP (ref 3.5–5.3)
RBC # BLD: 3.77 M/UL — LOW (ref 3.8–5.2)
RBC # BLD: 3.77 M/UL — LOW (ref 3.8–5.2)
RBC # FLD: 15.4 % — HIGH (ref 10.3–14.5)
RBC # FLD: 15.4 % — HIGH (ref 10.3–14.5)
SODIUM SERPL-SCNC: 144 MMOL/L — SIGNIFICANT CHANGE UP (ref 135–145)
SODIUM SERPL-SCNC: 144 MMOL/L — SIGNIFICANT CHANGE UP (ref 135–145)
WBC # BLD: 5.5 K/UL — SIGNIFICANT CHANGE UP (ref 3.8–10.5)
WBC # BLD: 5.5 K/UL — SIGNIFICANT CHANGE UP (ref 3.8–10.5)
WBC # FLD AUTO: 5.5 K/UL — SIGNIFICANT CHANGE UP (ref 3.8–10.5)
WBC # FLD AUTO: 5.5 K/UL — SIGNIFICANT CHANGE UP (ref 3.8–10.5)

## 2023-11-21 PROCEDURE — 70460 CT HEAD/BRAIN W/DYE: CPT | Mod: 26

## 2023-11-21 PROCEDURE — 75898 FOLLOW-UP ANGIOGRAPHY: CPT | Mod: 26

## 2023-11-21 PROCEDURE — 61624 TCAT PERM OCCLS/EMBOLJ CNS: CPT

## 2023-11-21 PROCEDURE — 75894 X-RAYS TRANSCATH THERAPY: CPT | Mod: 26

## 2023-11-21 PROCEDURE — 36224 PLACE CATH CAROTD ART: CPT | Mod: RT

## 2023-11-21 PROCEDURE — 36228 PLACE CATH INTRACRANIAL ART: CPT

## 2023-11-21 PROCEDURE — 99291 CRITICAL CARE FIRST HOUR: CPT

## 2023-11-21 PROCEDURE — 76377 3D RENDER W/INTRP POSTPROCES: CPT | Mod: 26

## 2023-11-21 RX ORDER — SERTRALINE 25 MG/1
100 TABLET, FILM COATED ORAL DAILY
Refills: 0 | Status: DISCONTINUED | OUTPATIENT
Start: 2023-11-21 | End: 2023-11-22

## 2023-11-21 RX ORDER — SACUBITRIL AND VALSARTAN 24; 26 MG/1; MG/1
1 TABLET, FILM COATED ORAL
Refills: 0 | Status: DISCONTINUED | OUTPATIENT
Start: 2023-11-21 | End: 2023-11-22

## 2023-11-21 RX ORDER — TICAGRELOR 90 MG/1
180 TABLET ORAL ONCE
Refills: 0 | Status: COMPLETED | OUTPATIENT
Start: 2023-11-21 | End: 2023-11-21

## 2023-11-21 RX ORDER — MAGNESIUM SULFATE 500 MG/ML
2 VIAL (ML) INJECTION ONCE
Refills: 0 | Status: COMPLETED | OUTPATIENT
Start: 2023-11-21 | End: 2023-11-21

## 2023-11-21 RX ORDER — SODIUM CHLORIDE 9 MG/ML
1000 INJECTION INTRAMUSCULAR; INTRAVENOUS; SUBCUTANEOUS
Refills: 0 | Status: DISCONTINUED | OUTPATIENT
Start: 2023-11-21 | End: 2023-11-22

## 2023-11-21 RX ORDER — ASPIRIN/CALCIUM CARB/MAGNESIUM 324 MG
325 TABLET ORAL DAILY
Refills: 0 | Status: DISCONTINUED | OUTPATIENT
Start: 2023-11-21 | End: 2023-11-22

## 2023-11-21 RX ORDER — POTASSIUM CHLORIDE 20 MEQ
40 PACKET (EA) ORAL ONCE
Refills: 0 | Status: COMPLETED | OUTPATIENT
Start: 2023-11-21 | End: 2023-11-21

## 2023-11-21 RX ORDER — CANGRELOR 50 MG/1
2 INJECTION, POWDER, LYOPHILIZED, FOR SOLUTION INTRAVENOUS
Qty: 50 | Refills: 0 | Status: DISCONTINUED | OUTPATIENT
Start: 2023-11-21 | End: 2023-11-21

## 2023-11-21 RX ORDER — LABETALOL HCL 100 MG
300 TABLET ORAL
Refills: 0 | Status: DISCONTINUED | OUTPATIENT
Start: 2023-11-21 | End: 2023-11-22

## 2023-11-21 RX ORDER — ATORVASTATIN CALCIUM 80 MG/1
80 TABLET, FILM COATED ORAL AT BEDTIME
Refills: 0 | Status: DISCONTINUED | OUTPATIENT
Start: 2023-11-21 | End: 2023-11-22

## 2023-11-21 RX ORDER — TICAGRELOR 90 MG/1
60 TABLET ORAL EVERY 12 HOURS
Refills: 0 | Status: DISCONTINUED | OUTPATIENT
Start: 2023-11-21 | End: 2023-11-22

## 2023-11-21 RX ORDER — RIVAROXABAN 15 MG-20MG
20 KIT ORAL DAILY
Refills: 0 | Status: DISCONTINUED | OUTPATIENT
Start: 2023-11-21 | End: 2023-11-22

## 2023-11-21 RX ORDER — ASPIRIN/CALCIUM CARB/MAGNESIUM 324 MG
325 TABLET ORAL ONCE
Refills: 0 | Status: COMPLETED | OUTPATIENT
Start: 2023-11-21 | End: 2023-11-21

## 2023-11-21 RX ADMIN — SODIUM CHLORIDE 70 MILLILITER(S): 9 INJECTION INTRAMUSCULAR; INTRAVENOUS; SUBCUTANEOUS at 21:52

## 2023-11-21 RX ADMIN — Medication 25 GRAM(S): at 21:49

## 2023-11-21 RX ADMIN — ATORVASTATIN CALCIUM 80 MILLIGRAM(S): 80 TABLET, FILM COATED ORAL at 21:49

## 2023-11-21 RX ADMIN — TICAGRELOR 180 MILLIGRAM(S): 90 TABLET ORAL at 13:45

## 2023-11-21 RX ADMIN — Medication 300 MILLIGRAM(S): at 23:09

## 2023-11-21 RX ADMIN — Medication 40 MILLIEQUIVALENT(S): at 21:50

## 2023-11-21 RX ADMIN — Medication 325 MILLIGRAM(S): at 12:59
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
## 2023-11-21 NOTE — PRE-ANESTHESIA EVALUATION ADULT - ANESTHESIA, PREVIOUS REACTION, PROFILE
Patient continues to appear anxious and agitated. Pt states no medication has been effective in controlling pain. Pt refusing immobilizer to L knee, and removing bed alarm from bed. Education provided regarding safety. Update provided to MD, new orders received for CIWA protocol. Patient currently scoring 12. Pt assisted with positioning, VSS, will monitor.   none
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
## 2023-11-21 NOTE — PROGRESS NOTE ADULT - ASSESSMENT
This is a 63 year old female with PMH of USAMA, Lacunar CVA with ICH in 1992, Antiphospholipid Syndrome on Xarelto, post-op DVT/PE in 2007, abnormal Echo on Entresto, HTN, HLD, Depression with complaint of Cerebral Aneurysm, s/p  right internal carotid artery a selective cerebral angiography and embolization of right loraine aneurysm with coils and stent.    POD 0: right internal carotid artery a selective cerebral angiography and embolization of right loraine aneurysm with coils and stent.    Neuro  -q1 hour neuro checks  -MR NOVA AM    Resp  -on RA  -IS  -maintain Sp02 >94%    CV  --160    GI  -NPO post op  -dysphagia screen and advance diet as tolerated      -indwelling urinary catheter, D/C in AM  -I&O  -NS 70cc/hr  -f/u post op renal function  -replete lytes PRN    ENDO  -maintain euglycemia 120-180    HEME  -continue home medication xarelto (APLS)  -ASA and Brillenta   -P2y12 in AM  -VTE ppx with SCDs, chemical ppx deferred at this time    ID  -monitor fever curve  -monitor for s/s of acute infectious process    Dispo:  patient to be under care of [facility] given post-op neurosurgical procedure with risk of deterioration and need for q1 hour interval monitoring This is a 63 year old female with PMH of USAMA, Lacunar CVA with ICH in 1992, Antiphospholipid Syndrome on Xarelto, post-op DVT/PE in 2007, abnormal Echo on Entresto, HTN, HLD, Depression with complaint of Cerebral Aneurysm, s/p  right internal carotid artery a selective cerebral angiography and embolization of right loraine aneurysm with coils and stent.    POD 0: right internal carotid artery a selective cerebral angiography and embolization of right loraine aneurysm with coils and stent.    Neuro  -q1 hour neuro checks  -MR NOVA AM  -P2Y12 80 on brillenta, p2y12 in AM    Resp  -on RA  -IS  -maintain Sp02 >94%    CV  --160    GI  -NPO post op  -dysphagia screen and advance diet as tolerated      -indwelling urinary catheter, D/C in AM  -I&O  -NS 70cc/hr, IVL when tolerating full diet  -f/u post op renal function  -replete lytes PRN    ENDO  -maintain euglycemia 120-180    HEME  -continue home medication xarelto (APLS)  -ASA and Brillenta   -P2y12 in AM  -VTE ppx with SCDs, chemical ppx deferred at this time    ID  -monitor fever curve  -monitor for s/s of acute infectious process    Dispo:  patient to be under care of [facility] given post-op neurosurgical procedure with risk of deterioration and need for q1 hour interval monitoring
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
## 2023-11-21 NOTE — CHART NOTE - NSCHARTNOTEFT_GEN_A_CORE
Called to bedside by RN for swelling noted around arterial line site placed in OR on arrival to PACU. Hematoma with swelling present at insertion site and medial to insertion site. Full motor and sensory function of left hand, positive radial pulse, brisk capillary refill. Left radial a-line removed, direct pressure to insertion site applied. Gauze and tape dressing applied, no increase in swelling, or hematoma noted from initial assessment. Hemostasis maintained. Will continue to monitor in PACU under [facility] care.

## 2023-11-21 NOTE — CHART NOTE - NSCHARTNOTEFT_GEN_A_CORE
CAPRINI SCORE [CLOT]    AGE RELATED RISK FACTORS                                                       MOBILITY RELATED FACTORS  [ ] Age 41-60 years                                            (1 Point)                  [x ] Bed rest                                                        (1 Point)  [x ] Age: 61-74 years                                           (2 Points)                 [ ] Plaster cast                                                   (2 Points)  [ ] Age= 75 years                                              (3 Points)                 [ ] Bed bound for more than 72 hours                 (2 Points)    DISEASE RELATED RISK FACTORS                                               GENDER SPECIFIC FACTORS  [ ] Edema in the lower extremities                       (1 Point)                  [ ] Pregnancy                                                     (1 Point)  [ ] Varicose veins                                               (1 Point)                  [ ] Post-partum < 6 weeks                                   (1 Point)             [ ] BMI > 25 Kg/m2                                            (1 Point)                  [ ] Hormonal therapy  or oral contraception          (1 Point)                 [ ] Sepsis (in the previous month)                        (1 Point)                  [ ] History of pregnancy complications                 (1 point)  [ ] Pneumonia or serious lung disease                                               [ ] Unexplained or recurrent                     (1 Point)           (in the previous month)                               (1 Point)  [ ] Abnormal pulmonary function test                     (1 Point)                 SURGERY RELATED RISK FACTORS  [ ] Acute myocardial infarction                              (1 Point)                 [ ]  Section                                             (1 Point)  [ ] Congestive heart failure (in the previous month)  (1 Point)               [ ] Minor surgery                                                  (1 Point)   [ ] Inflammatory bowel disease                             (1 Point)                 [ ] Arthroscopic surgery                                        (2 Points)  [ ] Central venous access                                      (2 Points)                [ ] General surgery lasting more than 45 minutes   (2 Points)       [ ] Stroke (in the previous month)                          (5 Points)               [ ] Elective arthroplasty                                         (5 Points)                                                                                                                                               HEMATOLOGY RELATED FACTORS                                                 TRAUMA RELATED RISK FACTORS  [ ] Prior episodes of VTE                                     (3 Points)                [ ] Fracture of the hip, pelvis, or leg                       (5 Points)  [ ] Positive family history for VTE                         (3 Points)                 [ ] Acute spinal cord injury (in the previous month)  (5 Points)  [ ] Prothrombin 02435 A                                     (3 Points)                 [ ] Paralysis  (less than 1 month)                             (5 Points)  [ ] Factor V Leiden                                             (3 Points)                  [ ] Multiple Trauma within 1 month                        (5 Points)  [x ] Lupus anticoagulants                                     (3 Points)                                                           [ ] Anticardiolipin antibodies                               (3 Points)                                                       [ ] High homocysteine in the blood                      (3 Points)                                             [ ] Other congenital or acquired thrombophilia      (3 Points)                                                [ ] Heparin induced thrombocytopenia                  (3 Points)                                          Total Score [      6    ]    Caprini Score 0 - 2:  Low Risk, No VTE Prophylaxis required for most patients, encourage ambulation  Caprini Score 3 - 6:  At Risk, pharmacologic VTE prophylaxis is indicated for most patients (in the absence of a contraindication)  Caprini Score Greater than or = 7:  High Risk, pharmacologic VTE prophylaxis is indicated for most patients (in the absence of a contraindication)

## 2023-11-21 NOTE — CHART NOTE - NSCHARTNOTEFT_GEN_A_CORE
Interventional Neuro- Radiology   Procedure Note NP    Procedure: Selective Cerebral Angiography   Pre- Procedure Diagnosis: right loraine aneurysm  Post- Procedure Diagnosis:    : Dr. Kilo Posada MD  Fellow: Dr. Eben Claros  NP: Rebekah Ramírez  RN:     Anesthesia:  (general anesthesia) Dr. Magdalena REBOLLEDO    Sheath:    I/Os:  Estimated blood loss less than 10cc  IV fluids:     cc     Urine output     cc        Contrast Omnipaque 240      cc               Preliminary Report:    Using a 5 Fr short/long sheath to the right groin under MAC/general anesthesia via   left vertebral artery,  left internal carotid artery, left external carotid artery, right vertebral arter,  right internal carotid artery, right external carotid artery  a selective cerebral angiography was performed and  demonstrates ________. ( Official note to follow).  Patient tolerated procedure well, hemodynamically stable, no change in neurological status compared to baseline.  Results discussed with neurosurgery, patient and patient's  family.  Groin sheath was removed,  manual compression held to hemostasis  for  21 minutes, no active bleeding, no hematoma, vascade device applied,  quick clot and safeguard balloon dressing applied at _____h.  Patient transferred to Interventional Neuro- Radiology   Procedure Note NP    Procedure: Selective Cerebral Angiography   Pre- Procedure Diagnosis: right loraine aneurysm  Post- Procedure Diagnosis: right loraine aneurysm embolized with coils and [place] stent    : Dr. Kilo Posada MD  Fellow: Dr. Eben Claros  NP: Rebekah Ramírez  RN:     Anesthesia:  (general anesthesia) Dr. Magdalena REBOLLEDO    Sheath: 6frenh b mx    I/Os:  Estimated blood loss less than 10cc  IV fluids: 800    cc     Urine output 400    cc        Contrast 104   cc               Preliminary Report:  Using a 6 Fr short sheath to the right groin under general anesthesia via right internal carotid artery a selective cerebral angiography and embolization of right loraine aneurysm with coils and stent was performed. ( Official note to follow).  Patient tolerated procedure well, hemodynamically stable, no change in neurological status compared to baseline.  Results discussed with neurosurgery, patient and patient's  family.  Groin sheath was removed,  manual compression held to hemostasis  for  21 minutes, no active bleeding, no hematoma, vascade device applied,  quick clot and safeguard balloon dressing applied at 1810h.  Patient transferred to PACU

## 2023-11-22 ENCOUNTER — TRANSCRIPTION ENCOUNTER (OUTPATIENT)
Age: 63
End: 2023-11-22

## 2023-11-22 VITALS
OXYGEN SATURATION: 96 % | SYSTOLIC BLOOD PRESSURE: 161 MMHG | DIASTOLIC BLOOD PRESSURE: 74 MMHG | RESPIRATION RATE: 12 BRPM | HEART RATE: 74 BPM

## 2023-11-22 LAB
PA ADP PRP-ACNC: 114 PRU — LOW (ref 194–417)
PA ADP PRP-ACNC: 114 PRU — LOW (ref 194–417)

## 2023-11-22 PROCEDURE — 70551 MRI BRAIN STEM W/O DYE: CPT | Mod: 26

## 2023-11-22 PROCEDURE — 70544 MR ANGIOGRAPHY HEAD W/O DYE: CPT | Mod: 26,59

## 2023-11-22 RX ORDER — TICAGRELOR 90 MG/1
1 TABLET ORAL
Qty: 60 | Refills: 0
Start: 2023-11-22 | End: 2023-12-21

## 2023-11-22 RX ORDER — ACETAMINOPHEN 500 MG
1000 TABLET ORAL ONCE
Refills: 0 | Status: COMPLETED | OUTPATIENT
Start: 2023-11-22 | End: 2023-11-22

## 2023-11-22 RX ORDER — ACETAMINOPHEN 500 MG
650 TABLET ORAL EVERY 6 HOURS
Refills: 0 | Status: DISCONTINUED | OUTPATIENT
Start: 2023-11-22 | End: 2023-11-22

## 2023-11-22 RX ORDER — ACETAMINOPHEN 500 MG
2 TABLET ORAL
Qty: 0 | Refills: 0 | DISCHARGE
Start: 2023-11-22

## 2023-11-22 RX ORDER — ATORVASTATIN CALCIUM 80 MG/1
1 TABLET, FILM COATED ORAL
Qty: 0 | Refills: 0 | DISCHARGE
Start: 2023-11-22

## 2023-11-22 RX ORDER — ASPIRIN/CALCIUM CARB/MAGNESIUM 324 MG
1 TABLET ORAL
Refills: 0 | DISCHARGE

## 2023-11-22 RX ADMIN — Medication 325 MILLIGRAM(S): at 11:55

## 2023-11-22 RX ADMIN — Medication 1000 MILLIGRAM(S): at 12:45

## 2023-11-22 RX ADMIN — Medication 650 MILLIGRAM(S): at 07:00

## 2023-11-22 RX ADMIN — SACUBITRIL AND VALSARTAN 1 TABLET(S): 24; 26 TABLET, FILM COATED ORAL at 05:03

## 2023-11-22 RX ADMIN — Medication 300 MILLIGRAM(S): at 05:02

## 2023-11-22 RX ADMIN — Medication 400 MILLIGRAM(S): at 12:33

## 2023-11-22 RX ADMIN — SODIUM CHLORIDE 70 MILLILITER(S): 9 INJECTION INTRAMUSCULAR; INTRAVENOUS; SUBCUTANEOUS at 12:33

## 2023-11-22 RX ADMIN — SERTRALINE 100 MILLIGRAM(S): 25 TABLET, FILM COATED ORAL at 11:55

## 2023-11-22 RX ADMIN — Medication 650 MILLIGRAM(S): at 08:00

## 2023-11-22 RX ADMIN — TICAGRELOR 60 MILLIGRAM(S): 90 TABLET ORAL at 05:04

## 2023-11-22 RX ADMIN — RIVAROXABAN 20 MILLIGRAM(S): KIT at 11:55

## 2023-11-22 NOTE — DISCHARGE NOTE PROVIDER - CARE PROVIDER_API CALL
Kilo Posada  Neurosurgery  [address], [address]  [place] [address]  Phone: [phone]  Fax: [phone]  Follow Up Time:    Kilo Posada  Neurosurgery  [address], [address]  [place] [address]  Phone: [phone]  Fax: [phone]  Follow Up Time:    Kilo Posada  Neurosurgery  [address], [address]  [place] [address]  Phone: [phone]  Fax: [phone]  Follow Up Time:

## 2023-11-22 NOTE — DISCHARGE NOTE PROVIDER - NSDCMRMEDTOKEN_GEN_ALL_CORE_FT
aspirin 325 mg oral tablet: 1 tab(s) orally once a day  Aspirin Enteric Coated 81 mg oral delayed release tablet: 1 tab(s) orally every other day (at bedtime)  atorvastatin 80 mg oral tablet: 1 tab(s) orally once a day (at bedtime)  Entresto 24 mg-26 mg oral tablet: 1 tab(s) orally 2 times a day  labetalol 300 mg oral tablet: 1 tab(s) orally 2 times a day  pantoprazole 40 mg oral delayed release tablet: 1 tab(s) orally once a day  Plavix 75 mg oral tablet: 1 tab(s) orally once a day  sertraline 100 mg oral tablet: 1 tab(s) orally once a day  Xarelto 20 mg oral tablet: 1 tab(s) orally once a day (in the evening)   acetaminophen 325 mg oral tablet: 2 tab(s) orally every 6 hours As needed Temp greater or equal to 38C (100.4F), Mild Pain (1 - 3)  aspirin 325 mg oral tablet: 1 tab(s) orally once a day  atorvastatin 80 mg oral tablet: 1 tab(s) orally once a day (at bedtime)  Entresto 24 mg-26 mg oral tablet: 1 tab(s) orally 2 times a day  labetalol 300 mg oral tablet: 1 tab(s) orally 2 times a day  pantoprazole 40 mg oral delayed release tablet: 1 tab(s) orally once a day  Plavix 75 mg oral tablet: 1 tab(s) orally once a day  sertraline 100 mg oral tablet: 1 tab(s) orally once a day  Xarelto 20 mg oral tablet: 1 tab(s) orally once a day (in the evening)   acetaminophen 325 mg oral tablet: 2 tab(s) orally every 6 hours As needed Temp greater or equal to 38C (100.4F), Mild Pain (1 - 3)  aspirin 325 mg oral tablet: 1 tab(s) orally once a day  atorvastatin 80 mg oral tablet: 1 tab(s) orally once a day (at bedtime)  Entresto 24 mg-26 mg oral tablet: 1 tab(s) orally 2 times a day  labetalol 300 mg oral tablet: 1 tab(s) orally 2 times a day  pantoprazole 40 mg oral delayed release tablet: 1 tab(s) orally once a day  sertraline 100 mg oral tablet: 1 tab(s) orally once a day  ticagrelor 60 mg oral tablet: 1 tab(s) orally every 12 hours  Xarelto 20 mg oral tablet: 1 tab(s) orally once a day (in the evening)

## 2023-11-22 NOTE — DISCHARGE NOTE PROVIDER - CARE PROVIDERS DIRECT ADDRESSES
[email or web address]

## 2023-11-22 NOTE — CONSULT NOTE ADULT - SUBJECTIVE AND OBJECTIVE BOX
CHIEF COMPLAINT:Patient is a 63y old  Female who presents with a chief complaint of Cerebral Angiogram with embolization of right loraine aneurysm with coils and stent 11/21/2023. (22 Nov 2023 07:04)      HISTORY OF PRESENT ILLNESS:    63 female with history as below known to me from office , has mild non ischemic CM , Antiphospholipid Syndrome on Xarelto, post-op DVT/PE in 2007 is now S/p Flow-diverter assisted coil embolization of Right LORAINE aneurysm  denies any chest pain, sob, palpitation, dizziness or syncope.       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      Antiphospholipid syndrome      Asthma      USAMA (obstructive sleep apnea)      2019 novel coronavirus disease (COVID-19)      H/O: depression      Cerebral aneurysm      S/P Tonsillectomy and Adenoidectomy      Tubal Ligation  1995      Status Post Umbilical Hernia Repair, Follow-Up Exam  10/05      Mass  benign mas removed between heart and spine, 8/07      S/P Dilatation and Curettage  9/09, uterine ablation      Dysphonia  gel injection into vocal cord 1/11      H/O foot surgery      H/O shoulder surgery      S/P hysterectomy      H/O colonoscopy      H/O endoscopy      History of cerebral angiography      S/P foot surgery              MEDICATIONS:  labetalol 300 milliGRAM(s) Oral two times a day  rivaroxaban 20 milliGRAM(s) Oral daily  sacubitril 24 mG/valsartan 26 mG 1 Tablet(s) Oral two times a day  ticagrelor 60 milliGRAM(s) Oral every 12 hours        acetaminophen     Tablet .. 650 milliGRAM(s) Oral every 6 hours PRN  aspirin 325 milliGRAM(s) Oral daily  sertraline 100 milliGRAM(s) Oral daily      atorvastatin 80 milliGRAM(s) Oral at bedtime    sodium chloride 0.9%. 1000 milliLiter(s) IV Continuous <Continuous>      FAMILY HISTORY:  Family history of cardiovascular disease (Father, Mother)        Non-contributory    SOCIAL HISTORY:    No tobacco, drugs or etoh    Allergies    No Known Allergies    Intolerances    	    REVIEW OF SYSTEMS:  as above  The rest of the 14 points ROS reviewed and except above they are unremarkable.        PHYSICAL EXAM:  T(C): 36.5 (11-22-23 @ 08:00), Max: 36.7 (11-21-23 @ 12:25)  HR: 77 (11-22-23 @ 08:00) (64 - 77)  BP: 134/68 (11-22-23 @ 08:00) (104/58 - 165/79)  RR: 16 (11-22-23 @ 08:00) (14 - 16)  SpO2: 95% (11-22-23 @ 08:00) (94% - 99%)  Wt(kg): --  I&O's Summary    21 Nov 2023 07:01  -  22 Nov 2023 07:00  --------------------------------------------------------  IN: 960 mL / OUT: 1320 mL / NET: -360 mL    22 Nov 2023 07:01  -  22 Nov 2023 08:31  --------------------------------------------------------  IN: 70 mL / OUT: 100 mL / NET: -30 mL      JVP: Normal  Neck: supple  Lung: clear   CV: S1 S2 , Murmur:  Abd: soft  Ext: No edema  neuro: Awake / alert  Psych: flat affect  Skin: normal      LABS/DATA:    TELEMETRY: 	    ECG:  	   	  CARDIAC MARKERS:                                      9.2    5.50  )-----------( 149      ( 21 Nov 2023 19:53 )             29.8     11-21    144  |  108  |  15  ----------------------------<  97  3.6   |  22  |  0.83    Ca    8.5      21 Nov 2023 19:53  Phos  3.9     11-21  Mg     1.5     11-21      proBNP:   Lipid Profile:   HgA1c:   TSH:

## 2023-11-22 NOTE — DISCHARGE NOTE PROVIDER - NSDCCPCAREPLAN_GEN_ALL_CORE_FT
PRINCIPAL DISCHARGE DIAGNOSIS  Diagnosis: Cerebral aneurysm  Assessment and Plan of Treatment: You had the following procedures performed at [facility]: cerebral angiogram with coil embolization of ALEX aneurysm 11/21/2023  Please follow up with Dr. Posada within 1-2 weeks of discharge   PLEASE CONTINUE THE FOLLOWING MEDICATIONS THEY ARE IMPORTANT FOR TREATMENT OF YOUR ANEURYSM (DO NOT stop these medications unless you have been instructed to do so by Dr. Posada):   - ASPIRIN 325 MG ONCE DAILY   - BRILINTA 60 MG EVERY 12 HOURS   Please make all necessary appointments and follow up. Please DO NOT take any additional NSAIDs products (Advil, Aleve, Motrin, Ibuprofen) until cleared by your Neurosurgeon. Please DO NOT do any heavy lifting, bending, twisting and straining. You may shower, but NO SOAP / NO SHAMPOO. DO NOT do any scrubbing. Pat dry only. Please come to the emergency room for any of the following: altered mental status, seizures, pain uncontrolled by pain medications, fevers, leaking / bleeding from GROIN site, chest pain and shortness of breath.  You have a skin puncture on your groin where the procedure was done, please monitor for excessive bruising, bleeding, palpable mass under the site and seek medical advice if present.  You are on Brilinta. Brilinta helps thin the blood.  It is important to continue this. Side effects of this medication include: bleeding, nosebleeds, easy bruising       PRINCIPAL DISCHARGE DIAGNOSIS  Diagnosis: Cerebral aneurysm  Assessment and Plan of Treatment: You had the following procedures performed at [facility]: cerebral angiogram with coil embolization of ALEX aneurysm 11/21/2023  Please follow up with Dr. Posada within 1-2 weeks of discharge   PLEASE CONTINUE THE FOLLOWING MEDICATIONS THEY ARE IMPORTANT FOR TREATMENT OF YOUR ANEURYSM (DO NOT stop these medications unless you have been instructed to do so by Dr. Posada):   - ASPIRIN 325 MG ONCE DAILY   - BRILINTA 60 MG EVERY 12 HOURS   Please make all necessary appointments and follow up. Please DO NOT take any additional NSAIDs products (Advil, Aleve, Motrin, Ibuprofen) until cleared by your Neurosurgeon. Please DO NOT do any heavy lifting, bending, twisting and straining. You may shower, but NO SOAP / NO SHAMPOO. DO NOT do any scrubbing. Pat dry only. Please come to the emergency room for any of the following: altered mental status, seizures, pain uncontrolled by pain medications, fevers, leaking / bleeding from GROIN site, chest pain and shortness of breath.  You have a skin puncture on your groin where the procedure was done, please monitor for excessive bruising, bleeding, palpable mass under the site and seek medical advice if present.  You are on Brilinta. Brilinta helps thin the blood.  It is important to continue this. Side effects of this medication include: bleeding, nosebleeds, easy bruising       PRINCIPAL DISCHARGE DIAGNOSIS  Diagnosis: Cerebral aneurysm  Assessment and Plan of Treatment: You had the following procedures performed at [facility]: cerebral angiogram with coil embolization of ALEX aneurysm 11/21/2023  Please follow up with Dr. Posada within 1-2 weeks of discharge   PLEASE CONTINUE THE FOLLOWING MEDICATIONS THEY ARE IMPORTANT FOR TREATMENT OF YOUR ANEURYSM (DO NOT stop these medications unless you have been instructed to do so by Dr. Posada):   - ASPIRIN 325 MG ONCE DAILY   - BRILINTA 60 MG EVERY 12 HOURS   Please make all necessary appointments and follow up. Please DO NOT take any additional NSAIDs products (Advil, Aleve, Motrin, Ibuprofen) until cleared by your Neurosurgeon. Please DO NOT do any heavy lifting, bending, twisting and straining. You may shower, but NO SOAP / NO SHAMPOO. DO NOT do any scrubbing. Pat dry only. Please come to the emergency room for any of the following: altered mental status, seizures, pain uncontrolled by pain medications, fevers, leaking / bleeding from GROIN site, chest pain and shortness of breath.  You have a skin puncture on your groin where the procedure was done, please monitor for excessive bruising, bleeding, palpable mass under the site and seek medical advice if present.  You are on Brilinta. Brilinta helps thin the blood.  It is important to continue this. Side effects of this medication include: bleeding, nosebleeds, easy bruising       PRINCIPAL DISCHARGE DIAGNOSIS  Diagnosis: Cerebral aneurysm  Assessment and Plan of Treatment: You had the following procedures performed at [facility]: cerebral angiogram with coil embolization of ALEX aneurysm 11/21/2023  Please follow up with Dr. Posada within 1-2 weeks of discharge   PLEASE CONTINUE THE FOLLOWING MEDICATIONS THEY ARE IMPORTANT FOR TREATMENT OF YOUR ANEURYSM (DO NOT stop these medications unless you have been instructed to do so by Dr. Posada):   - ASPIRIN 325 MG ONCE DAILY   - BRILINTA 60 MG EVERY 12 HOURS   Please make all necessary appointments and follow up. Please DO NOT take any additional NSAIDs products (Advil, Aleve, Motrin, Ibuprofen) until cleared by your Neurosurgeon. Please DO NOT do any heavy lifting, bending, twisting and straining. You may shower, but NO SOAP / NO SHAMPOO. DO NOT do any scrubbing. Pat dry only. Please come to the emergency room for any of the following: altered mental status, seizures, pain uncontrolled by pain medications, fevers, leaking / bleeding from GROIN site, chest pain and shortness of breath.  You have a skin puncture on your groin where the procedure was done, please monitor for excessive bruising, bleeding, palpable mass under the site and seek medical advice if present.  You are on Brilinta. Brilinta helps thin the blood.  It is important to continue this. Side effects of this medication include: bleeding, nosebleeds, easy bruising        SECONDARY DISCHARGE DIAGNOSES  Diagnosis: Antiphospholipid syndrome  Assessment and Plan of Treatment: continue home dose xarelto        PRINCIPAL DISCHARGE DIAGNOSIS  Diagnosis: Cerebral aneurysm  Assessment and Plan of Treatment: You had the following procedures performed at [facility]: cerebral angiogram with coil embolization of ALEX aneurysm 11/21/2023  Please follow up with Dr. Posada within 1-2 weeks of discharge   PLEASE CONTINUE THE FOLLOWING MEDICATIONS THEY ARE IMPORTANT FOR TREATMENT OF YOUR ANEURYSM (DO NOT stop these medications unless you have been instructed to do so by Dr. Posada):   - ASPIRIN 325 MG ONCE DAILY   - BRILINTA 60 MG EVERY 12 HOURS   Please make all necessary appointments and follow up. Please DO NOT take any additional NSAIDs products (Advil, Aleve, Motrin, Ibuprofen) until cleared by your Neurosurgeon. Please DO NOT do any heavy lifting, bending, twisting and straining. You may shower, but NO SOAP / NO SHAMPOO. DO NOT do any scrubbing. Pat dry only. Please come to the emergency room for any of the following: altered mental status, seizures, pain uncontrolled by pain medications, fevers, leaking / bleeding from GROIN site, chest pain and shortness of breath.  You have a skin puncture on your groin where the procedure was done, please monitor for excessive bruising, bleeding, palpable mass under the site and seek medical advice if present.  You are on Brilinta. Brilinta helps thin the blood.  It is important to continue this. Side effects of this medication include: bleeding, nosebleeds, easy bruising        SECONDARY DISCHARGE DIAGNOSES  Diagnosis: Antiphospholipid syndrome  Assessment and Plan of Treatment: continue home dose xarelto        PRINCIPAL DISCHARGE DIAGNOSIS  Diagnosis: Cerebral aneurysm  Assessment and Plan of Treatment: You had the following procedures performed at [facility]: cerebral angiogram with coil embolization of ALEX aneurysm 11/21/2023  Please follow up with Dr. Posada within 1-2 weeks of discharge   PLEASE CONTINUE THE FOLLOWING MEDICATIONS THEY ARE IMPORTANT FOR TREATMENT OF YOUR ANEURYSM (DO NOT stop these medications unless you have been instructed to do so by Dr. Posada):   - ASPIRIN 325 MG ONCE DAILY   - BRILINTA 60 MG EVERY 12 HOURS   Please make all necessary appointments and follow up. Please DO NOT take any additional NSAIDs products (Advil, Aleve, Motrin, Ibuprofen) until cleared by your Neurosurgeon. Please DO NOT do any heavy lifting, bending, twisting and straining. You may shower, but NO SOAP / NO SHAMPOO. DO NOT do any scrubbing. Pat dry only. Please come to the emergency room for any of the following: altered mental status, seizures, pain uncontrolled by pain medications, fevers, leaking / bleeding from GROIN site, chest pain and shortness of breath.  You have a skin puncture on your groin where the procedure was done, please monitor for excessive bruising, bleeding, palpable mass under the site and seek medical advice if present.  You are on Brilinta. Brilinta helps thin the blood.  It is important to continue this. Side effects of this medication include: bleeding, nosebleeds, easy bruising        SECONDARY DISCHARGE DIAGNOSES  Diagnosis: Antiphospholipid syndrome  Assessment and Plan of Treatment: continue home dose xarelto        PRINCIPAL DISCHARGE DIAGNOSIS  Diagnosis: Cerebral aneurysm  Assessment and Plan of Treatment: You had the following procedures performed at [facility]: cerebral angiogram with coil embolization of ALEX aneurysm 11/21/2023  Please follow up with Dr. Posada within 1-2 weeks of discharge   PLEASE CONTINUE THE FOLLOWING MEDICATIONS THEY ARE IMPORTANT FOR TREATMENT OF YOUR ANEURYSM (DO NOT stop these medications unless you have been instructed to do so by Dr. Posada):   - ASPIRIN 325 MG ONCE DAILY   - BRILINTA 60 MG EVERY 12 HOURS   Please make all necessary appointments and follow up. Please DO NOT take any additional NSAIDs products (Advil, Aleve, Motrin, Ibuprofen) until cleared by your Neurosurgeon. Please DO NOT do any heavy lifting, bending, twisting and straining. You may shower, but NO SOAP / NO SHAMPOO. DO NOT do any scrubbing. Pat dry only. Please come to the emergency room for any of the following: altered mental status, seizures, pain uncontrolled by pain medications, fevers, leaking / bleeding from GROIN site, chest pain and shortness of breath.  You have a skin puncture on your groin where the procedure was done, please monitor for excessive bruising, bleeding, palpable mass under the site and seek medical advice if present.  You are on Brilinta. Brilinta helps thin the blood.  It is important to continue this. Side effects of this medication include: bleeding, nosebleeds, easy bruising        SECONDARY DISCHARGE DIAGNOSES  Diagnosis: Antiphospholipid syndrome  Assessment and Plan of Treatment: continue home dose xarelto   Please follow up with your primary care provider within 1-2 weeks of discharge       Diagnosis: Hypertension  Assessment and Plan of Treatment: continue home dose labetalol and home dose entresto.  Please follow up with your primary care provider within 1-2 weeks of discharge     PRINCIPAL DISCHARGE DIAGNOSIS  Diagnosis: Cerebral aneurysm  Assessment and Plan of Treatment: You had the following procedures performed at [facility]: cerebral angiogram with coil embolization of ALEX aneurysm 11/21/2023  Please follow up with Dr. Posada within 1-2 weeks of discharge   PLEASE CONTINUE THE FOLLOWING MEDICATIONS THEY ARE IMPORTANT FOR TREATMENT OF YOUR ANEURYSM (DO NOT stop these medications unless you have been instructed to do so by Dr. Posada):   - ASPIRIN 325 MG ONCE DAILY   - BRILINTA 60 MG EVERY 12 HOURS   Please make all necessary appointments and follow up. Please DO NOT take any additional NSAIDs products (Advil, Aleve, Motrin, Ibuprofen) until cleared by your Neurosurgeon. Please DO NOT do any heavy lifting, bending, twisting and straining. You may shower, but NO SOAP / NO SHAMPOO. DO NOT do any scrubbing. Pat dry only. Please come to the emergency room for any of the following: altered mental status, seizures, pain uncontrolled by pain medications, fevers, leaking / bleeding from GROIN site, chest pain and shortness of breath.  You have a skin puncture on your groin where the procedure was done, please monitor for excessive bruising, bleeding, palpable mass under the site and seek medical advice if present.  You are on Brilinta. Brilinta helps thin the blood.  It is important to continue this. Side effects of this medication include: bleeding, nosebleeds, easy bruising        SECONDARY DISCHARGE DIAGNOSES  Diagnosis: Antiphospholipid syndrome  Assessment and Plan of Treatment: continue home dose xarelto   Please follow up with your primary care provider within 1-2 weeks of discharge       Diagnosis: Hypertension  Assessment and Plan of Treatment: continue home dose labetalol and home dose entresto.  Please follow up with your primary care provider within 1-2 weeks of discharge     PRINCIPAL DISCHARGE DIAGNOSIS  Diagnosis: Cerebral aneurysm  Assessment and Plan of Treatment: You had the following procedures performed at [facility]: cerebral angiogram with coil embolization of ALEX aneurysm 11/21/2023  Please follow up with Dr. Posada within 1-2 weeks of discharge   PLEASE CONTINUE THE FOLLOWING MEDICATIONS THEY ARE IMPORTANT FOR TREATMENT OF YOUR ANEURYSM (DO NOT stop these medications unless you have been instructed to do so by Dr. Posada):   - ASPIRIN 325 MG ONCE DAILY   - BRILINTA 60 MG EVERY 12 HOURS   Please make all necessary appointments and follow up. Please DO NOT take any additional NSAIDs products (Advil, Aleve, Motrin, Ibuprofen) until cleared by your Neurosurgeon. Please DO NOT do any heavy lifting, bending, twisting and straining. You may shower, but NO SOAP / NO SHAMPOO. DO NOT do any scrubbing. Pat dry only. Please come to the emergency room for any of the following: altered mental status, seizures, pain uncontrolled by pain medications, fevers, leaking / bleeding from GROIN site, chest pain and shortness of breath.  You have a skin puncture on your groin where the procedure was done, please monitor for excessive bruising, bleeding, palpable mass under the site and seek medical advice if present.  You are on Brilinta. Brilinta helps thin the blood.  It is important to continue this. Side effects of this medication include: bleeding, nosebleeds, easy bruising        SECONDARY DISCHARGE DIAGNOSES  Diagnosis: Antiphospholipid syndrome  Assessment and Plan of Treatment: continue home dose xarelto   Please follow up with your primary care provider within 1-2 weeks of discharge       Diagnosis: Hypertension  Assessment and Plan of Treatment: continue home dose labetalol and home dose entresto.  Please follow up with your primary care provider within 1-2 weeks of discharge

## 2023-11-22 NOTE — DISCHARGE NOTE PROVIDER - PROVIDER TOKENS
PROVIDER:[TOKEN:[[id number]]] PROVIDER:[TOKEN:[[id number]]] PROVIDER:[TOKEN:[[id number]]]

## 2023-11-22 NOTE — DISCHARGE NOTE NURSING/CASE MANAGEMENT/SOCIAL WORK - NSDCVIVACCINE_GEN_ALL_CORE_FT
Td (adult) preservative free; 15-Darwin-2023 14:07; rGeg Khan (TREVIN); [employer]; [id number] (Exp. Date: 20-May-2025); IntraMuscular; Deltoid Left.; 0.5 milliLiter(s); VIS (VIS Published: 15-Darwin-2023, VIS Presented: 15-Darwin-2023);

## 2023-11-22 NOTE — DISCHARGE NOTE NURSING/CASE MANAGEMENT/SOCIAL WORK - PATIENT PORTAL LINK FT
You can access the FollowMyHealth Patient Portal offered by [facility] by registering at the following website: [email or web address]. By joining [facility]’s FollowMyHealth portal, you will also be able to view your health information using other applications (apps) compatible with our system.

## 2023-11-22 NOTE — DISCHARGE NOTE NURSING/CASE MANAGEMENT/SOCIAL WORK - NSDCPNINST_GEN_ALL_CORE
******************************************************************************************  Next dose of TYLENOL may be taken at or after 630 PM if needed. DO NOT take any additional products containing TYLENOL or ACETAMINOPHEN, such as VICODIN, PERCOCET, NORCO, EXCEDRIN, and any over-the-counter cold medications until this time. DO NOT CONSUME MORE THAN 3631-7453 MG of TYLENOL (acetaminophen) in a 24-hour period.
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
## 2023-11-22 NOTE — PROGRESS NOTE ADULT - ASSESSMENT
This is a 63 year old female with PMH of USAMA, Lacunar CVA with ICH in 1992, Antiphospholipid Syndrome on Xarelto, post-op DVT/PE in 2007, abnormal Echo on Entresto, HTN, HLD, Depression with complaint of Cerebral Aneurysm, s/p  right internal carotid artery a selective cerebral angiography and embolization of right loraine aneurysm with coils and stent.    POD 0: right internal carotid artery a selective cerebral angiography and embolization of right loraine aneurysm with coils and stent.    Neuro  -q1 hour neuro checks  -MR NOVA AM  -P2Y12 80 on brillenta, p2y12 in AM    Resp  -on RA  -IS  -maintain Sp02 >94%    CV  --160    GI  -NPO post op  -dysphagia screen and advance diet as tolerated      -indwelling urinary catheter, D/C in AM  -I&O  -NS 70cc/hr, IVL when tolerating full diet  -f/u post op renal function  -replete lytes PRN    ENDO  -maintain euglycemia 120-180    HEME  -continue home medication xarelto (APLS)  -ASA and Brillenta   -P2y12 in AM  -VTE ppx with SCDs, chemical ppx deferred at this time    ID  -monitor fever curve  -monitor for s/s of acute infectious process    Dispo:  patient to be under care of [facility] given post-op neurosurgical procedure with risk of deterioration and need for q1 hour interval monitoring

## 2023-11-22 NOTE — CONSULT NOTE ADULT - ASSESSMENT
Non ischemic CM   stable   cont BB / entresto  eventual cardiac MRI can be done as outpt     antiphospholipid ab syndrome  on a/c    HTN  stable  cont current meds
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
## 2023-11-22 NOTE — PROGRESS NOTE ADULT - SUBJECTIVE AND OBJECTIVE BOX
Patient seen and examined at bedside.    --Anticoagulation--  rivaroxaban 20 milliGRAM(s) Oral daily  ticagrelor 60 milliGRAM(s) Oral every 12 hours    T(C): 36.1 (11-22-23 @ 04:00), Max: 36.7 (11-21-23 @ 12:25)  HR: 71 (11-22-23 @ 07:00) (64 - 71)  BP: 158/77 (11-22-23 @ 07:00) (104/58 - 165/79)  RR: 14 (11-22-23 @ 07:00) (14 - 16)  SpO2: 95% (11-22-23 @ 07:00) (94% - 99%)  Wt(kg): --    Exam: AOx3, EOMI, no facial, no drift, MARSHALL 5/5, SILT

## 2023-11-22 NOTE — PROGRESS NOTE ADULT - ASSESSMENT
-S/p Flow-diverter assisted coil embolization of Right ALEX aneurysm  -Check PRU in AM, got loaded with Brilinta  -MRI/A Justa AM  -D/c Home in AM

## 2023-11-22 NOTE — DISCHARGE NOTE NURSING/CASE MANAGEMENT/SOCIAL WORK - NSDCPEFALRISK_GEN_ALL_CORE
For information on Fall & Injury Prevention, visit: [email or web address] OR  [email or web address] OR  https://www.cdc.gov/steadi/patient.html

## 2023-11-24 PROCEDURE — C1887: CPT

## 2023-11-24 PROCEDURE — 83735 ASSAY OF MAGNESIUM: CPT

## 2023-11-24 PROCEDURE — C2628: CPT

## 2023-11-24 PROCEDURE — 80048 BASIC METABOLIC PNL TOTAL CA: CPT

## 2023-11-24 PROCEDURE — 36224 PLACE CATH CAROTD ART: CPT

## 2023-11-24 PROCEDURE — 84100 ASSAY OF PHOSPHORUS: CPT

## 2023-11-24 PROCEDURE — C1760: CPT

## 2023-11-24 PROCEDURE — 36415 COLL VENOUS BLD VENIPUNCTURE: CPT

## 2023-11-24 PROCEDURE — 85025 COMPLETE CBC W/AUTO DIFF WBC: CPT

## 2023-11-24 PROCEDURE — 70551 MRI BRAIN STEM W/O DYE: CPT

## 2023-11-24 PROCEDURE — C1894: CPT

## 2023-11-24 PROCEDURE — 76380 CAT SCAN FOLLOW-UP STUDY: CPT

## 2023-11-24 PROCEDURE — C9460: CPT

## 2023-11-24 PROCEDURE — 85576 BLOOD PLATELET AGGREGATION: CPT

## 2023-11-24 PROCEDURE — C9399: CPT

## 2023-11-24 PROCEDURE — 36228 PLACE CATH INTRACRANIAL ART: CPT

## 2023-11-24 PROCEDURE — 70544 MR ANGIOGRAPHY HEAD W/O DYE: CPT

## 2023-11-24 PROCEDURE — 61624 TCAT PERM OCCLS/EMBOLJ CNS: CPT

## 2023-11-24 PROCEDURE — 75894 X-RAYS TRANSCATH THERAPY: CPT

## 2023-11-24 PROCEDURE — C1757: CPT

## 2023-11-24 PROCEDURE — C1889: CPT

## 2023-11-24 PROCEDURE — 75898 FOLLOW-UP ANGIOGRAPHY: CPT

## 2023-11-24 PROCEDURE — C1769: CPT

## 2023-11-30 ENCOUNTER — APPOINTMENT (OUTPATIENT)
Dept: NEUROSURGERY | Facility: CLINIC | Age: 63
End: 2023-11-30
Payer: COMMERCIAL

## 2023-11-30 VITALS
SYSTOLIC BLOOD PRESSURE: 107 MMHG | BODY MASS INDEX: 37.49 KG/M2 | HEIGHT: 65 IN | WEIGHT: 225 LBS | OXYGEN SATURATION: 98 % | HEART RATE: 62 BPM | DIASTOLIC BLOOD PRESSURE: 74 MMHG

## 2023-11-30 DIAGNOSIS — I67.1 CEREBRAL ANEURYSM, NONRUPTURED: ICD-10-CM

## 2023-11-30 PROCEDURE — 99212 OFFICE O/P EST SF 10 MIN: CPT

## 2023-11-30 RX ORDER — TICAGRELOR 60 MG/1
60 TABLET ORAL
Refills: 0 | Status: ACTIVE | COMMUNITY

## 2023-12-01 PROBLEM — I67.1 CEREBRAL ANEURYSM: Status: ACTIVE | Noted: 2023-08-17

## 2023-12-08 RX ORDER — TICAGRELOR 60 MG/1
60 TABLET ORAL TWICE DAILY
Qty: 60 | Refills: 8 | Status: ACTIVE | COMMUNITY
Start: 2023-12-08 | End: 1900-01-01

## 2023-12-08 RX ORDER — CLOPIDOGREL BISULFATE 75 MG/1
75 TABLET, FILM COATED ORAL
Qty: 30 | Refills: 8 | Status: DISCONTINUED | COMMUNITY
Start: 2023-11-03 | End: 2023-12-08

## 2023-12-13 ENCOUNTER — TRANSCRIPTION ENCOUNTER (OUTPATIENT)
Age: 63
End: 2023-12-13

## 2024-01-23 NOTE — H&P PST ADULT - PATIENT'S GENDER IDENTITY
Caller: DavidLester    Relationship: Self    Best call back number: [phone] HOME OR CELL [phone]    What is the best time to reach you: ANYTIME    Who are you requesting to speak with (clinical staff, provider,  specific staff member): CLINICAL        What was the call regarding: PATIENT STATED THAT HE SPOKE WITH HIS PULMONOLOGIST DR. DAPHNEY TATE AND HE OK'D HIM FOR THE ANESTHESIA TO GET HIS EGD. PLEASE CALL PATIENT BACK.       
RN called and discussed recommendations with patient. Pt is agreeable and referral has been routed back to scheduling and updated. Pt requested My Chart message with Dr. Francis's office number, RN sent message. EL   
Female

## 2024-01-26 NOTE — ED ADULT TRIAGE NOTE - IDEAL BODY WEIGHT(KG)
Additional Notes: Discussed case with Dr. LIVINGSTON and she recommended yearly skin checks. Lesion most likely secondary to inflammatory skin process. Recommended continue follow up with PCP and if new lesions appears return to office Render Risk Assessment In Note?: no Detail Level: Simple 57

## 2024-02-09 RX ORDER — TICAGRELOR 60 MG/1
60 TABLET ORAL TWICE DAILY
Qty: 180 | Refills: 2 | Status: ACTIVE | COMMUNITY
Start: 2024-02-09 | End: 1900-01-01

## 2024-02-20 ENCOUNTER — NON-APPOINTMENT (OUTPATIENT)
Age: 64
End: 2024-02-20

## 2024-02-25 NOTE — PRE-OP CHECKLIST - BP NONINVASIVE DIASTOLIC (MM HG)
10/7/17 FT C/S PEC rx with magnesium sulfate, [facility] 10/7/17 FT C/S PEC rx with magnesium sulfate, [address]

## 2024-02-29 ENCOUNTER — TRANSCRIPTION ENCOUNTER (OUTPATIENT)
Age: 64
End: 2024-02-29

## 2024-03-04 NOTE — PACU DISCHARGE NOTE - NSPTMEETSDISCHCRITERIADT_GEN_A_CORE
17-Oct-2023 14:35
17-Oct-2023 16:03
Bed/Stretcher in lowest position, wheels locked, appropriate side rails in place/Call bell, personal items and telephone in reach/Instruct patient to call for assistance before getting out of bed/chair/stretcher/Non-slip footwear applied when patient is off stretcher/[place] to call system/Physically safe environment - no spills, clutter or unnecessary equipment/Purposeful proactive rounding/Room/bathroom lighting operational, light cord in reach

## 2024-03-29 ENCOUNTER — OUTPATIENT (OUTPATIENT)
Dept: OUTPATIENT SERVICES | Facility: HOSPITAL | Age: 64
LOS: 1 days | End: 2024-03-29
Payer: COMMERCIAL

## 2024-03-29 VITALS
HEART RATE: 76 BPM | WEIGHT: 231.93 LBS | TEMPERATURE: 99 F | SYSTOLIC BLOOD PRESSURE: 114 MMHG | HEIGHT: 65 IN | RESPIRATION RATE: 14 BRPM | OXYGEN SATURATION: 96 %

## 2024-03-29 DIAGNOSIS — I67.1 CEREBRAL ANEURYSM, NONRUPTURED: ICD-10-CM

## 2024-03-29 DIAGNOSIS — Z98.890 OTHER SPECIFIED POSTPROCEDURAL STATES: Chronic | ICD-10-CM

## 2024-03-29 DIAGNOSIS — Z90.710 ACQUIRED ABSENCE OF BOTH CERVIX AND UTERUS: Chronic | ICD-10-CM

## 2024-03-29 DIAGNOSIS — Z01.818 ENCOUNTER FOR OTHER PREPROCEDURAL EXAMINATION: ICD-10-CM

## 2024-03-29 LAB
ANION GAP SERPL CALC-SCNC: 15 MMOL/L — SIGNIFICANT CHANGE UP (ref 5–17)
BLD GP AB SCN SERPL QL: NEGATIVE — SIGNIFICANT CHANGE UP
BUN SERPL-MCNC: 22 MG/DL — SIGNIFICANT CHANGE UP (ref 7–23)
CALCIUM SERPL-MCNC: 9.6 MG/DL — SIGNIFICANT CHANGE UP (ref 8.4–10.5)
CHLORIDE SERPL-SCNC: 103 MMOL/L — SIGNIFICANT CHANGE UP (ref 96–108)
CO2 SERPL-SCNC: 23 MMOL/L — SIGNIFICANT CHANGE UP (ref 22–31)
CREAT SERPL-MCNC: 0.9 MG/DL — SIGNIFICANT CHANGE UP (ref 0.5–1.3)
EGFR: 72 ML/MIN/1.73M2 — SIGNIFICANT CHANGE UP
GLUCOSE SERPL-MCNC: 119 MG/DL — HIGH (ref 70–99)
HCT VFR BLD CALC: 35.4 % — SIGNIFICANT CHANGE UP (ref 34.5–45)
HGB BLD-MCNC: 10.8 G/DL — LOW (ref 11.5–15.5)
MCHC RBC-ENTMCNC: 24.4 PG — LOW (ref 27–34)
MCHC RBC-ENTMCNC: 30.5 GM/DL — LOW (ref 32–36)
MCV RBC AUTO: 79.9 FL — LOW (ref 80–100)
NRBC # BLD: 0 /100 WBCS — SIGNIFICANT CHANGE UP (ref 0–0)
PA ADP PRP-ACNC: 103 PRU — LOW (ref 194–417)
PLATELET # BLD AUTO: 187 K/UL — SIGNIFICANT CHANGE UP (ref 150–400)
PLATELET RESPONSE ASPIRIN RESULT: 545 ARU — SIGNIFICANT CHANGE UP
POTASSIUM SERPL-MCNC: 3.5 MMOL/L — SIGNIFICANT CHANGE UP (ref 3.5–5.3)
POTASSIUM SERPL-SCNC: 3.5 MMOL/L — SIGNIFICANT CHANGE UP (ref 3.5–5.3)
RBC # BLD: 4.43 M/UL — SIGNIFICANT CHANGE UP (ref 3.8–5.2)
RBC # FLD: 19.8 % — HIGH (ref 10.3–14.5)
RH IG SCN BLD-IMP: POSITIVE — SIGNIFICANT CHANGE UP
SODIUM SERPL-SCNC: 141 MMOL/L — SIGNIFICANT CHANGE UP (ref 135–145)
WBC # BLD: 10.89 K/UL — HIGH (ref 3.8–10.5)
WBC # FLD AUTO: 10.89 K/UL — HIGH (ref 3.8–10.5)

## 2024-03-29 PROCEDURE — G0463: CPT

## 2024-03-29 PROCEDURE — 85027 COMPLETE CBC AUTOMATED: CPT

## 2024-03-29 PROCEDURE — 85576 BLOOD PLATELET AGGREGATION: CPT

## 2024-03-29 PROCEDURE — 80048 BASIC METABOLIC PNL TOTAL CA: CPT

## 2024-03-29 PROCEDURE — 86850 RBC ANTIBODY SCREEN: CPT

## 2024-03-29 PROCEDURE — 86900 BLOOD TYPING SEROLOGIC ABO: CPT

## 2024-03-29 PROCEDURE — 86901 BLOOD TYPING SEROLOGIC RH(D): CPT

## 2024-03-29 RX ORDER — LABETALOL HCL 100 MG
1 TABLET ORAL
Refills: 0 | DISCHARGE

## 2024-03-29 NOTE — H&P PST ADULT - PROBLEM SELECTOR PLAN 1
Cerebral Angiogram  -cbc, bmp, type & screen, ARU, PRU @ PST  -preop instructions provided, surgical soap and instructions given  -continue A/C  -continue antihypertensive medication  -ABO on admit

## 2024-03-29 NOTE — H&P PST ADULT - OTHER CARE PROVIDERS
Cardiologist-- Sergio Ang (last visit february 2024) // Pulmonary --Artem Payne (last visit 3 years ago) // Hematologist--Dr. Ball [place], [phone] (last visit 3/28/2024 for f/u and iron infusion) // Neurologist-- Linda Gómez
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
## 2024-03-29 NOTE — H&P PST ADULT - NSICDXPASTSURGICALHX_GEN_ALL_CORE_FT
PAST SURGICAL HISTORY:  Dysphonia gel injection into vocal cord 1/11    H/O colonoscopy     H/O endoscopy     H/O foot surgery     H/O shoulder surgery     History of cerebral angiography     Mass benign mas removed between heart and spine, 8/07    S/P coil embolization of cerebral aneurysm     S/P Dilatation and Curettage 9/09, uterine ablation    S/P foot surgery     S/P hysterectomy     S/P Tonsillectomy and Adenoidectomy     Status Post Umbilical Hernia Repair, Follow-Up Exam 10/05    Tubal Ligation 1995

## 2024-04-02 ENCOUNTER — TRANSCRIPTION ENCOUNTER (OUTPATIENT)
Age: 64
End: 2024-04-02

## 2024-04-02 ENCOUNTER — APPOINTMENT (OUTPATIENT)
Dept: NEUROSURGERY | Facility: HOSPITAL | Age: 64
End: 2024-04-02

## 2024-04-02 ENCOUNTER — OUTPATIENT (OUTPATIENT)
Dept: OUTPATIENT SERVICES | Facility: HOSPITAL | Age: 64
LOS: 1 days | End: 2024-04-02
Payer: COMMERCIAL

## 2024-04-02 VITALS
SYSTOLIC BLOOD PRESSURE: 113 MMHG | HEART RATE: 58 BPM | OXYGEN SATURATION: 98 % | RESPIRATION RATE: 14 BRPM | DIASTOLIC BLOOD PRESSURE: 68 MMHG

## 2024-04-02 VITALS
OXYGEN SATURATION: 97 % | HEART RATE: 74 BPM | WEIGHT: 225.09 LBS | SYSTOLIC BLOOD PRESSURE: 153 MMHG | RESPIRATION RATE: 15 BRPM | DIASTOLIC BLOOD PRESSURE: 90 MMHG | TEMPERATURE: 98 F | HEIGHT: 65 IN

## 2024-04-02 DIAGNOSIS — Z90.710 ACQUIRED ABSENCE OF BOTH CERVIX AND UTERUS: Chronic | ICD-10-CM

## 2024-04-02 DIAGNOSIS — Z98.890 OTHER SPECIFIED POSTPROCEDURAL STATES: Chronic | ICD-10-CM

## 2024-04-02 DIAGNOSIS — I67.1 CEREBRAL ANEURYSM, NONRUPTURED: ICD-10-CM

## 2024-04-02 PROCEDURE — C1894: CPT

## 2024-04-02 PROCEDURE — 36224 PLACE CATH CAROTD ART: CPT | Mod: RT

## 2024-04-02 PROCEDURE — C1887: CPT

## 2024-04-02 PROCEDURE — C1760: CPT

## 2024-04-02 PROCEDURE — 36224 PLACE CATH CAROTD ART: CPT

## 2024-04-02 PROCEDURE — C1769: CPT

## 2024-04-02 RX ORDER — PANTOPRAZOLE SODIUM 20 MG/1
1 TABLET, DELAYED RELEASE ORAL
Refills: 0 | DISCHARGE

## 2024-04-02 RX ORDER — SACUBITRIL AND VALSARTAN 24; 26 MG/1; MG/1
1 TABLET, FILM COATED ORAL
Refills: 0 | DISCHARGE

## 2024-04-02 RX ORDER — GABAPENTIN 400 MG/1
1 CAPSULE ORAL
Refills: 0 | DISCHARGE

## 2024-04-02 RX ORDER — LABETALOL HCL 100 MG
2 TABLET ORAL
Refills: 0 | DISCHARGE

## 2024-04-02 RX ORDER — FOLIC ACID 0.8 MG
1 TABLET ORAL
Refills: 0 | DISCHARGE

## 2024-04-02 RX ORDER — SERTRALINE 25 MG/1
1 TABLET, FILM COATED ORAL
Refills: 0 | DISCHARGE

## 2024-04-02 NOTE — ASU DISCHARGE PLAN (ADULT/PEDIATRIC) - NURSING INSTRUCTIONS
Please feel free to contact us at [phone] if any problems arise. After 6PM, Monday through Friday, on weekends and on holidays, please call [phone] and ask for the radiology resident on call to be paged.

## 2024-04-02 NOTE — ASU PATIENT PROFILE, ADULT - FALL HARM RISK - HARM RISK INTERVENTIONS

## 2024-04-02 NOTE — ASU DISCHARGE PLAN (ADULT/PEDIATRIC) - CONDITION AT DISCHARGE
RX PROGRESS NOTE: Vancomycin Therapeutic Drug Monitoring      Indication and target trough: Bacteremia (10-15 mcg/mL)    Anticipated duration of therapy and end date:    ALLERGIES:   Allergen Reactions   • Nickel RASH   • Wasp Sting SWELLING       Most recent height and weight information:  Weight: 53.1 kg (03/12/21 1135)  Height: 5' 5\" (165.1 cm) (03/12/21 1135)    The Following are the calculated  Current Weights for Chelsea Gomez            Adjusted Ideal    53.1 kg 57 kg            Labs:  Serum Creatinine and Creatinine Clearance:  Serum creatinine: 0.72 mg/dL 03/12/21 1154  Estimated creatinine clearance: 68.8 mL/min    Microbiology Results     None            Assessment/Plan:  Briefly, this is a 61 year old female started on vancomycin for Bacteremia, with a target serum trough concentration of 10-15 mcg/mL. Initial dosing regimen will be 1250 mg loading dose once, followed by a maintenance dose of 1000 mg every 12 hours..    Pharmacy will continue to monitor levels, renal function, microbiology data, and risk factors for adverse events. Adjustments will be made if needed.    Thank you,    Lalitha Dodson RPH  3/12/2021 4:30 PM   Stable
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
## 2024-04-02 NOTE — CHART NOTE - NSCHARTNOTEFT_GEN_A_CORE
Interventional Neuro- Radiology   Procedure Note      Procedure: Selective Cerebral Angiography   Pre- Procedure Diagnosis: right A2 aneurysm s/p coil/stent   Post- Procedure Diagnosis:    : Dr. Albino MD  Fellow: Dr. Harlan MD   Physician Assistant: Joselyn Melendrez PA-C    RN:  Tech:    Anesthesia: Dr. Posada ([facility])      I/Os:  Fluids:  Cannon: DTV   Contrast:  Estimated Blood Loss: <10cc    Preliminary Report:  Under MAC, using a ___Fr short/long sheath to the right femoral artery examination of left vertebral artery/ left internal carotid artery/ left external carotid artery/ right vertebral artery/ right internal carotid artery/ right external carotid artery via selective cerebral angiography demonstrates ________. ( Official note to follow).    Patient tolerated procedure well, vital signs stable, hemodynamically stable, no change in neurological status compared to baseline. Results discussed with neurosurgery/ patient and their family. Groin sheath d/c'ed, manual compression held to hemostasis, no active bleeding, no hematoma, Vascade applied, quick clot and safeguard balloon dressing applied at _____h. Patient transferred to IR recovery for further care/ monitoring. Interventional Neuro- Radiology   Procedure Note      Procedure: Selective Cerebral Angiography   Pre- Procedure Diagnosis: right A2 aneurysm s/p coil/stent   Post- Procedure Diagnosis: no residual filling of aneurysm     : Dr. Albino MD  Fellow: Dr. Rodriguez   Physician Assistant: Joselyn Melendrez PA-C    RN: Alfredito Sheriff: Christina/      Anesthesia: Dr. Posada ([facility])      I/Os:  Fluids: 200cc   Cannon: DTV   Contrast: 14cc   Estimated Blood Loss: <10cc    Preliminary Report:  Under MAC, using a 5Fr short sheath to the right femoral artery examination of right internal carotid artery via selective cerebral angiography demonstrates no residual filling of aneurysm, no in stent stenosis. ( Official note to follow).    Patient tolerated procedure well, vital signs stable, hemodynamically stable, no change in neurological status compared to baseline. Results discussed with neurosurgery/ patient and their family. Groin sheath d/c'ed, manual compression held to hemostasis, no active bleeding, no hematoma, Vascade applied, quick clot and safeguard balloon dressing applied at 1140h. Patient transferred to IR recovery for further care/ monitoring.

## 2024-04-02 NOTE — ASU DISCHARGE PLAN (ADULT/PEDIATRIC) - CARE PROVIDER_API CALL
Kilo Posada  Neurosurgery  [address], [address]  [place] [address]  Phone: [phone]  Fax: [phone]  Follow Up Time:

## 2024-04-09 DIAGNOSIS — I67.1 CEREBRAL ANEURYSM, NONRUPTURED: ICD-10-CM

## 2024-04-11 ENCOUNTER — APPOINTMENT (OUTPATIENT)
Dept: NEUROSURGERY | Facility: CLINIC | Age: 64
End: 2024-04-11
Payer: COMMERCIAL

## 2024-04-11 PROCEDURE — 99441: CPT

## 2024-04-17 ENCOUNTER — TRANSCRIPTION ENCOUNTER (OUTPATIENT)
Age: 64
End: 2024-04-17

## 2024-04-24 ENCOUNTER — TRANSCRIPTION ENCOUNTER (OUTPATIENT)
Age: 64
End: 2024-04-24

## 2024-04-24 ENCOUNTER — APPOINTMENT (OUTPATIENT)
Dept: MRI IMAGING | Facility: HOSPITAL | Age: 64
End: 2024-04-24

## 2024-05-16 NOTE — ED CDU PROVIDER INITIAL DAY NOTE - NS ED ATTENDING STATEMENT MOD
This was a shared visit with the TAMMY. I reviewed and verified the documentation and independently performed the documented: No

## 2024-06-24 ENCOUNTER — APPOINTMENT (OUTPATIENT)
Dept: OBGYN | Facility: CLINIC | Age: 64
End: 2024-06-24

## 2024-07-23 ENCOUNTER — APPOINTMENT (OUTPATIENT)
Dept: OBGYN | Facility: CLINIC | Age: 64
End: 2024-07-23
Payer: COMMERCIAL

## 2024-07-23 PROCEDURE — 99386 PREV VISIT NEW AGE 40-64: CPT

## 2024-07-23 PROCEDURE — 96127 BRIEF EMOTIONAL/BEHAV ASSMT: CPT

## 2024-07-23 PROCEDURE — 99459 PELVIC EXAMINATION: CPT

## 2024-08-20 NOTE — H&P PST ADULT - NSICDXFAMILYHX_GEN_ALL_CORE_FT
Patient called in because she stated that her and guillermo had a conversation about the medicine and that she will need to take this up to 3 times a day. Patient stated that when she went to  her meds, that the directions she was given at the pharmacy was to take it up to twice a day. Patient will like to be contacted back on if this is the correct meds as well as the correct dosage    
FAMILY HISTORY:  Father  Still living? Unknown  Family history of cardiovascular disease, Age at diagnosis: Age Unknown    Mother  Still living? Unknown  Family history of cardiovascular disease, Age at diagnosis: Age Unknown

## 2024-10-29 ENCOUNTER — TRANSCRIPTION ENCOUNTER (OUTPATIENT)
Age: 64
End: 2024-10-29

## 2024-11-01 ENCOUNTER — EMERGENCY (EMERGENCY)
Facility: HOSPITAL | Age: 64
LOS: 1 days | Discharge: HOME | End: 2024-11-01
Attending: STUDENT IN AN ORGANIZED HEALTH CARE EDUCATION/TRAINING PROGRAM
Payer: COMMERCIAL

## 2024-11-01 VITALS
HEART RATE: 69 BPM | TEMPERATURE: 98 F | SYSTOLIC BLOOD PRESSURE: 155 MMHG | RESPIRATION RATE: 18 BRPM | OXYGEN SATURATION: 97 % | WEIGHT: 244.93 LBS | HEIGHT: 65 IN | DIASTOLIC BLOOD PRESSURE: 91 MMHG

## 2024-11-01 VITALS
RESPIRATION RATE: 16 BRPM | SYSTOLIC BLOOD PRESSURE: 158 MMHG | HEART RATE: 55 BPM | OXYGEN SATURATION: 96 % | TEMPERATURE: 98 F | DIASTOLIC BLOOD PRESSURE: 94 MMHG

## 2024-11-01 DIAGNOSIS — Z98.890 OTHER SPECIFIED POSTPROCEDURAL STATES: Chronic | ICD-10-CM

## 2024-11-01 DIAGNOSIS — Z90.710 ACQUIRED ABSENCE OF BOTH CERVIX AND UTERUS: Chronic | ICD-10-CM

## 2024-11-01 LAB
ADD ON TEST-SPECIMEN IN LAB: SIGNIFICANT CHANGE UP
ALBUMIN SERPL ELPH-MCNC: 4.5 G/DL — SIGNIFICANT CHANGE UP (ref 3.3–5)
ALP SERPL-CCNC: 103 U/L — SIGNIFICANT CHANGE UP (ref 40–120)
ALT FLD-CCNC: 20 U/L — SIGNIFICANT CHANGE UP (ref 10–45)
ANION GAP SERPL CALC-SCNC: 16 MMOL/L — SIGNIFICANT CHANGE UP (ref 5–17)
APTT BLD: 45.1 SEC — HIGH (ref 24.5–35.6)
AST SERPL-CCNC: 21 U/L — SIGNIFICANT CHANGE UP (ref 10–40)
BASOPHILS # BLD AUTO: 0.06 K/UL — SIGNIFICANT CHANGE UP (ref 0–0.2)
BASOPHILS NFR BLD AUTO: 1 % — SIGNIFICANT CHANGE UP (ref 0–2)
BILIRUB SERPL-MCNC: 0.4 MG/DL — SIGNIFICANT CHANGE UP (ref 0.2–1.2)
BUN SERPL-MCNC: 17 MG/DL — SIGNIFICANT CHANGE UP (ref 7–23)
CALCIUM SERPL-MCNC: 10.2 MG/DL — SIGNIFICANT CHANGE UP (ref 8.4–10.5)
CHLORIDE SERPL-SCNC: 101 MMOL/L — SIGNIFICANT CHANGE UP (ref 96–108)
CO2 SERPL-SCNC: 23 MMOL/L — SIGNIFICANT CHANGE UP (ref 22–31)
CREAT SERPL-MCNC: 0.93 MG/DL — SIGNIFICANT CHANGE UP (ref 0.5–1.3)
EGFR: 69 ML/MIN/1.73M2 — SIGNIFICANT CHANGE UP
EOSINOPHIL # BLD AUTO: 0.11 K/UL — SIGNIFICANT CHANGE UP (ref 0–0.5)
EOSINOPHIL NFR BLD AUTO: 1.8 % — SIGNIFICANT CHANGE UP (ref 0–6)
GLUCOSE SERPL-MCNC: 109 MG/DL — HIGH (ref 70–99)
HCT VFR BLD CALC: 40.1 % — SIGNIFICANT CHANGE UP (ref 34.5–45)
HGB BLD-MCNC: 12.5 G/DL — SIGNIFICANT CHANGE UP (ref 11.5–15.5)
IMM GRANULOCYTES NFR BLD AUTO: 0.3 % — SIGNIFICANT CHANGE UP (ref 0–0.9)
INR BLD: 1.3 RATIO — HIGH (ref 0.85–1.16)
LYMPHOCYTES # BLD AUTO: 1.18 K/UL — SIGNIFICANT CHANGE UP (ref 1–3.3)
LYMPHOCYTES # BLD AUTO: 19.1 % — SIGNIFICANT CHANGE UP (ref 13–44)
MCHC RBC-ENTMCNC: 26.6 PG — LOW (ref 27–34)
MCHC RBC-ENTMCNC: 31.2 G/DL — LOW (ref 32–36)
MCV RBC AUTO: 85.3 FL — SIGNIFICANT CHANGE UP (ref 80–100)
MONOCYTES # BLD AUTO: 0.33 K/UL — SIGNIFICANT CHANGE UP (ref 0–0.9)
MONOCYTES NFR BLD AUTO: 5.3 % — SIGNIFICANT CHANGE UP (ref 2–14)
NEUTROPHILS # BLD AUTO: 4.47 K/UL — SIGNIFICANT CHANGE UP (ref 1.8–7.4)
NEUTROPHILS NFR BLD AUTO: 72.5 % — SIGNIFICANT CHANGE UP (ref 43–77)
NRBC # BLD: 0 /100 WBCS — SIGNIFICANT CHANGE UP (ref 0–0)
PLATELET # BLD AUTO: 194 K/UL — SIGNIFICANT CHANGE UP (ref 150–400)
POTASSIUM SERPL-MCNC: 4.4 MMOL/L — SIGNIFICANT CHANGE UP (ref 3.5–5.3)
POTASSIUM SERPL-SCNC: 4.4 MMOL/L — SIGNIFICANT CHANGE UP (ref 3.5–5.3)
PROT SERPL-MCNC: 7.5 G/DL — SIGNIFICANT CHANGE UP (ref 6–8.3)
PROTHROM AB SERPL-ACNC: 14.9 SEC — HIGH (ref 9.9–13.4)
RBC # BLD: 4.7 M/UL — SIGNIFICANT CHANGE UP (ref 3.8–5.2)
RBC # FLD: 14.6 % — HIGH (ref 10.3–14.5)
SODIUM SERPL-SCNC: 140 MMOL/L — SIGNIFICANT CHANGE UP (ref 135–145)
WBC # BLD: 6.17 K/UL — SIGNIFICANT CHANGE UP (ref 3.8–10.5)
WBC # FLD AUTO: 6.17 K/UL — SIGNIFICANT CHANGE UP (ref 3.8–10.5)

## 2024-11-01 PROCEDURE — 86850 RBC ANTIBODY SCREEN: CPT

## 2024-11-01 PROCEDURE — 99285 EMERGENCY DEPT VISIT HI MDM: CPT

## 2024-11-01 PROCEDURE — 85610 PROTHROMBIN TIME: CPT

## 2024-11-01 PROCEDURE — 85025 COMPLETE CBC W/AUTO DIFF WBC: CPT

## 2024-11-01 PROCEDURE — 99285 EMERGENCY DEPT VISIT HI MDM: CPT | Mod: 25

## 2024-11-01 PROCEDURE — 86901 BLOOD TYPING SEROLOGIC RH(D): CPT

## 2024-11-01 PROCEDURE — 70496 CT ANGIOGRAPHY HEAD: CPT | Mod: MC

## 2024-11-01 PROCEDURE — 93005 ELECTROCARDIOGRAM TRACING: CPT

## 2024-11-01 PROCEDURE — 70498 CT ANGIOGRAPHY NECK: CPT | Mod: MC

## 2024-11-01 PROCEDURE — 86900 BLOOD TYPING SEROLOGIC ABO: CPT

## 2024-11-01 PROCEDURE — 80053 COMPREHEN METABOLIC PANEL: CPT

## 2024-11-01 PROCEDURE — 83036 HEMOGLOBIN GLYCOSYLATED A1C: CPT

## 2024-11-01 PROCEDURE — 70450 CT HEAD/BRAIN W/O DYE: CPT | Mod: 26,59,MC

## 2024-11-01 PROCEDURE — 85730 THROMBOPLASTIN TIME PARTIAL: CPT

## 2024-11-01 PROCEDURE — 70496 CT ANGIOGRAPHY HEAD: CPT | Mod: 26,MC

## 2024-11-01 PROCEDURE — 70498 CT ANGIOGRAPHY NECK: CPT | Mod: 26,MC

## 2024-11-01 PROCEDURE — 82962 GLUCOSE BLOOD TEST: CPT

## 2024-11-01 PROCEDURE — 70450 CT HEAD/BRAIN W/O DYE: CPT | Mod: MC

## 2024-11-01 NOTE — ED ADULT NURSE NOTE - NS ED NURSE LEVEL OF CONSCIOUSNESS AFFECT
Lvm for pt to schedule a 4m f/u @ [facility] [address] office with Dr Lyudmila Cornejo or CAM in July or first available 
Calm/Appropriate
Yes

## 2024-11-01 NOTE — ED PROVIDER NOTE - NSFOLLOWUPINSTRUCTIONS_ED_ALL_ED_FT
Continue rivaroxaban (Xeralto) and aspirin 325mg daily.     Start Atorvastatin 80mg. It was sent to your pharmacy.     Continue outpatient Physical Therapy.     Follow up with Dr. Londono (Neurosurgery), Dr. Fox (Neurology), and Dr. Ang (Cardiology)    Follow with a vascular neurologist: Stroke Neurology at [address] [place] [address], [address] [place] [address], [phone]       Return to ED for any new or worsening symptoms including but not limited to: development of chest pain, shortness of breath, fever, vomiting, focal numbness, weakness or tingling, any severe CP, headache, abdominal pain, back pain.

## 2024-11-01 NOTE — ED ADULT NURSE NOTE - OBJECTIVE STATEMENT
64 year old female, A&Ox4, PMH HTN, HLD, antiphospholipid syndrome on xarelto, anemia, brain aneurysm embolization (Nov 2023) presenting to ED for abnormal MRI results. Patient was seen outpatient d/t increasing falls and unsteady gait over the past 2 months. MRI shows concern for stroke and patient was sent to ED for further evaluation. Patient states last fall was one week ago, denies head trauma. Also endorsing episode of blurred vision that resolve. Denies CP, SOB, HA, n/v/d, abdominal pain, difficulty urinating, fevers and chills. Heart rate and rhythm regular. Respirations clear and equal bilaterally. Pupils equal round and reactive to light. EOM intact. No slurred speech or facial droop noted. UE and LE sensation and motor intact. Peripheral pulses strong and equal bilaterally. IV placed and labs drawn. Safety and comfort measures maintained.

## 2024-11-01 NOTE — ED PROVIDER NOTE - PROGRESS NOTE DETAILS
Shakira Palafox MD (Attending MD): MRI Impression:     "small chronic appearing left corona, raidiata infarct. microvascular disease. possible sites of prior microhemmhorage.. question punctuate focus of restricted diffusion w/ anterior alondra.". Shakira Palafox MD (Attending MD): Patient evaluated by neuro. rec outpatient work up. will provide outpatient f/u w/ neuro. has outpatient pt. patient amendable to this.

## 2024-11-01 NOTE — ED PROVIDER NOTE - ATTENDING CONTRIBUTION TO CARE
Attending MD Palafox: I have seen and examined this patient and fully participated in the care of this patient as the teaching attending. I personally made/approved the management plan and take responsibility for the patient management.       see mdm for attestation

## 2024-11-01 NOTE — CONSULT NOTE ADULT - ASSESSMENT
ED vitals notable for: ***. Labs notable for: ***. CT imaging disclosed: ***. Exam notable for ***.     NIHSS on admission: 1  LKN: Unknown  pre-MRS: 1    Impression: Doubt actual pontine infarct based on imaging review. If real - then likely incidental. Stroke workup complete (other than TTE - although she reports she had one recently outpatient). She is already on both therapeutic AC and high-dose aspirin. Walking unassisted.    Recommendations:  [] Can continue home rivaroxaban (for APS) and aspirin 325mg daily (reports she takes for history of aneurysm coil)  [] Atorvastatin 80mg (goal LDL <70)  [] A1c, lipid panel outpatient  [] TTE without bubble outpatient if not performed recently  [] She should proceed with her plans for outpatientPT  [] Follow up with Dr. Londono (Neurosurgery), Dr. Fox (Neurology), and Dr. Ang (Cardiology)  [] Would recommend she follow with a vascular neurologist: Stroke Neurology at [address] [place] [address], [address] [place] [address], [phone]   [] Stroke education provided  [] Smoking cessation education not needed - non-smoker    NO tenecteplase d/t outside therapeutic window.  NO thrombectomy d/t no ELVO.    Patient/plan d/w Stroke fellow, Dr. Higgins, under the supervision of attending vascular neurologist Dr. Bar. ANDRES COPPOLA is a 64y woman, with PMH significant for left corona radiata infarct (without residual deficits), right ALEX aneurysm (s/p flow-diverter assisted coil embolization of aneurysm with Dr. Posada in 11/2023), migraine headaches, HTN, HLD, APS (on rivaroxaban), DVT/PE, who presents to [facility] ED, at the behest of her outpatient neurology provider, on 11/1/2024, with c/o abnormal imaging finding and frequent falls.    ED vitals notable for: HR 69, /91, RR 16-18, SpO2 96-97% on RA. Labs notable for: INR 1.30 (on rivaroxaban). CT imaging disclosed: left corona radiata chronic infarct, R ALEX aneurysm s/p coiling, plaque in the b/l intracranial vertebral arteries without significant stenosis. Exam notable for nonfocal neurologic exam.     NIHSS on admission: 1  LKN: Unknown  pre-MRS: 1    Impression: Doubt actual pontine infarct based on imaging review. If real - then likely incidental. Stroke workup complete (other than TTE - although she reports she had one recently outpatient). She is already on both therapeutic AC and high-dose aspirin. Walking unassisted.    Recommendations:  [] Can continue home rivaroxaban (for APS) and aspirin 325mg daily (reports she takes for history of aneurysm coil)  [] Atorvastatin 80mg (goal LDL <70)  [] A1c, lipid panel outpatient  [] TTE without bubble outpatient if not performed recently  [] She should proceed with her plans for outpatient PT  [] Follow up with Dr. Londono (Neurosurgery), Dr. Fox (Neurology), and Dr. Ang (Cardiology)  [] Would recommend she follow with a vascular neurologist: Stroke Neurology at [address] [place] [address], [address] [place] [address], [phone]   [] Stroke education provided  [] Smoking cessation education not needed - non-smoker    NO tenecteplase d/t outside therapeutic window.  NO thrombectomy d/t no ELVO.    Patient/plan d/w Stroke fellow, Dr. Higgins, under the supervision of attending vascular neurologist Dr. Bar. ANDRES COPPOLA is a 64y woman, with PMH significant for left corona radiata infarct (denies residual deficits), ICH (1992), right ALEX aneurysm (s/p flow-diverter assisted coil embolization of aneurysm with Dr. Posada in 11/2023), migraine headaches, HTN, HLD, APS (on rivaroxaban), DVT/PE, who presents to [facility] ED, at the behest of her outpatient neurology provider, on 11/1/2024, with c/o abnormal imaging finding and frequent falls.    ED vitals notable for: HR 69, /91, RR 16-18, SpO2 96-97% on RA. Labs notable for: INR 1.30 (on rivaroxaban). CT imaging disclosed: left corona radiata chronic infarct, R ALEX aneurysm s/p coiling, plaque in the b/l intracranial vertebral arteries without significant stenosis. 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    NIHSS on admission: 2 (+2 facial - paralysis of the R lower face)  LKN: Unknown  pre-MRS: 2    Impression: Doubt actual pontine infarct based on imaging review. If real - then likely incidental. Stroke workup complete (other than TTE - although she reports she had one recently outpatient). She is already on both therapeutic AC and high-dose aspirin. Walking unassisted.    Recommendations:  [] Can continue home rivaroxaban (for APS) and aspirin 325mg daily (reports she takes for history of aneurysm coil)  [] Atorvastatin 80mg (goal LDL <70)  [] A1c, lipid panel outpatient  [] TTE without bubble outpatient if not performed recently  [] She should proceed with her plans for outpatient PT  [] Follow up with Dr. Londono (Neurosurgery), Dr. Fox (Neurology), and Dr. Ang (Cardiology)  [] Would recommend she follow with a vascular neurologist: Stroke Neurology at [address] [place] [address], [address] [place] [address], [phone]   [] Stroke education provided  [] Smoking cessation education not needed - non-smoker    NO tenecteplase d/t outside therapeutic window.  NO thrombectomy d/t no ELVO.    Patient/plan d/w Stroke fellow, Dr. Higgins, under the supervision of attending vascular neurologist Dr. Bar. ANDRES COPPOLA is a 64y woman, with PMH significant for left corona radiata infarct (denies residual deficits - says clinically silent), ICH (1992, no residual deficits), right ALEX aneurysm (s/p flow-diverter assisted coil embolization of aneurysm with Dr. Posada in 11/2023), migraine headaches, HTN, HLD, APS (on rivaroxaban), DVT/PE, who presents to [facility] ED, at the behest of her outpatient neurology provider, on 11/1/2024, with c/o abnormal imaging finding and frequent falls.    ED vitals notable for: HR 69, /91, RR 16-18, SpO2 96-97% on RA. Labs notable for: INR 1.30 (on rivaroxaban). CT imaging disclosed: left corona radiata chronic infarct, R ALEX aneurysm s/p coiling, plaque in the b/l intracranial vertebral arteries without significant stenosis. 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    NIHSS on admission: 2 (+2 facial - paralysis of the R lower face)  LKN: Unknown  pre-MRS: 2    Impression: Doubt actual pontine infarct based on imaging review. If real - then likely incidental. Stroke workup complete (other than TTE - although she reports she had one recently outpatient). She is already on both therapeutic AC and high-dose aspirin. Walking unassisted.    Recommendations:  [] Can continue home rivaroxaban (for APS) and aspirin 325mg daily (reports she takes for history of aneurysm coiling)  [] Continue home atorvastatin 80mg (goal LDL <70)  [] A1c, lipid panel outpatient  [] TTE without bubble outpatient if not performed recently  [] She should proceed with her plans for outpatient PT  [] Follow up with Dr. Londono (Neurosurgery), Dr. Fox (Neurology), and Dr. Ang (Cardiology)  [] Would recommend she follow with a vascular neurologist: Stroke Neurology at [address] [place] [address], [address] [place] [address], [phone]   [] Stroke education provided  [] Smoking cessation education not needed - non-smoker    NO tenecteplase d/t outside therapeutic window.  NO thrombectomy d/t no ELVO.    Patient/plan d/w Stroke fellow, Dr. Higgins, under the supervision of attending vascular neurologist Dr. Bar.

## 2024-11-01 NOTE — ED PROVIDER NOTE - PATIENT PORTAL LINK FT
You can access the FollowMyHealth Patient Portal offered by [facility] by registering at the following website: [email or web address]. By joining [facility]’s FollowMyHealth portal, you will also be able to view your health information using other applications (apps) compatible with our system.
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
## 2024-11-01 NOTE — ED PROVIDER NOTE - CLINICAL SUMMARY MEDICAL DECISION MAKING FREE TEXT BOX
Shakira Palafox MD (Attending MD): Patient is a 63 y/o F with hx of USAMA, Lacunar CVA with ICH in 1992, Antiphospholipid Syndrome on Xarelto, post-op DVT/PE in 2007, abnormal Echo on Entresto, HTN, HLD, Depression, Cerebral aneurysm and A2 segment status post embolization in November 2023 presenting to emergency department with abnormal MRI.  Patient states over the last  year she has been having recurrent falls, room spinning sensation, difficulty ambulating, weakness that may be worse in the left side.  She is unclear if she has any residual deficits from her previous stroke.  She follows with [facility] and an outpatient MRI was done which showed  concern for new CVA.  Patient brought the MRI disc with her.  Patient denies any chest pain, shortness breath, nausea, vomiting, fever, chills, urinary or bowel changes.   No headaches.  Walks without assistance at baseline.  Patient is compliant with her anticoagulation.    CONSTITUTIONAL: No fevers, no chills, no lightheadedness, no dizziness  EYES: no visual changes, no eye pain  EARS: no ear drainage, no ear pain, no change in hearing  NOSE: no nasal congestion  MOUTH/THROAT: no sore throat  CV: No chest pain, no palpitations  RESP: No SOB, no cough  GI: No n/v/d, no abd pain  : no dysuria, no hematuria, no flank pain  MSK: no back pain, no extremity pain  SKIN: no rashes  NEURO: see hpi   PSYCHIATRIC: no known mental health issues    General: Alert and Orientated x 3. No apparent distress.  Head: Normocephalic and atraumatic.  Eyes: PERRLA with EOMI.  No nystagmus  Neck: Supple. Trachea midline.   Cardiac: Normal S1 and S2 w/ RRR. No murmurs appreciated. No JVD appreciated.  Pulmonary: CTA bilaterally. No increased WOB. No wheezes or crackles.  Abdominal: Soft, non-tender. (+) bowel sounds appreciated in all 4 quadrants. No hepatosplenomegaly.   Neurologic:   Mild right-sided facial droop sparing the forehead, otherwise cranial 2-12 grossly intact.. no dysmetria, normal finger-to-nose, heel-to-shin.  Gait is unsteady with her leaning towards her left side more occasionally than the right.  No sensory deficits.  Strength is 5 out of 5 in all extremities.  No pronator drift.  No aphasia, word finding difficulties.  Musculoskeletal: Strength appropriate in all 4 extremities for age with no limited ROM.  Skin: Color appropriate for race. Intact, warm, and well-perfused.  Psychiatric: Appropriate mood and affect. No apparent risk to self or others.     MDM:   64-year-old female with history of lacunar infarct, antiphospholipid syndrome, cerebral aneurysm status post embolization presenting with concern for new stroke on MRI.  Patient does have  a mild  facial droop as well as difficulty ambulating for which she says is due to weakness.  Will upload MRI,  however if unable to will get CT head and CTAs of the head and neck.  Will get labs including coags, CBC, CMP, Trope, A1c.  Will discuss with neurology team pending imaging.  Disposition likely admission for further workup.    *The above represents an initial assessment/impression. Please refer to progress notes for potential changes in patient clinical course* Shakira Palafox MD (Attending MD): Patient is a 65 y/o F with hx of USAMA, Lacunar CVA with ICH in 1992, Antiphospholipid Syndrome on Xarelto, post-op DVT/PE in 2007, abnormal Echo on Entresto, HTN, HLD, Depression, Cerebral aneurysm and A2 segment status post embolization in November 2023 presenting to emergency department with abnormal MRI.  Patient states over the last  year she has been having recurrent falls, room spinning sensation, difficulty ambulating, weakness that may be worse in the left side.  She is unclear if she has any residual deficits from her previous stroke.  She follows with [facility] and an outpatient MRI was done which showed  impression of " small chronic appearing left corona, raidiata infarct. microvascular disease. possible sites of prior microhemmhorage.. question punctuate focus of restricted diffusion w/ anterior alondra.".  Patient brought the MRI disc with her.  Patient denies any chest pain, shortness breath, nausea, vomiting, fever, chills, urinary or bowel changes.   No headaches.  Walks without assistance at baseline.  Patient is compliant with her anticoagulation.    CONSTITUTIONAL: No fevers, no chills, no lightheadedness, no dizziness  EYES: no visual changes, no eye pain  EARS: no ear drainage, no ear pain, no change in hearing  NOSE: no nasal congestion  MOUTH/THROAT: no sore throat  CV: No chest pain, no palpitations  RESP: No SOB, no cough  GI: No n/v/d, no abd pain  : no dysuria, no hematuria, no flank pain  MSK: no back pain, no extremity pain  SKIN: no rashes  NEURO: see hpi   PSYCHIATRIC: no known mental health issues    General: Alert and Orientated x 3. No apparent distress.  Head: Normocephalic and atraumatic.  Eyes: PERRLA with EOMI.  No nystagmus  Neck: Supple. Trachea midline.   Cardiac: Normal S1 and S2 w/ RRR. No murmurs appreciated. No JVD appreciated.  Pulmonary: CTA bilaterally. No increased WOB. No wheezes or crackles.  Abdominal: Soft, non-tender. (+) bowel sounds appreciated in all 4 quadrants. No hepatosplenomegaly.   Neurologic:   Mild right-sided facial droop sparing the forehead, otherwise cranial 2-12 grossly intact.. no dysmetria, normal finger-to-nose, heel-to-shin.  Gait is unsteady with her leaning towards her left side more occasionally than the right.  No sensory deficits.  Strength is 5 out of 5 in all extremities.  No pronator drift.  No aphasia, word finding difficulties.  Musculoskeletal: Strength appropriate in all 4 extremities for age with no limited ROM.  Skin: Color appropriate for race. Intact, warm, and well-perfused.  Psychiatric: Appropriate mood and affect. No apparent risk to self or others.     MDM:   64-year-old female with history of lacunar infarct, antiphospholipid syndrome, cerebral aneurysm status post embolization presenting with concern for new stroke on MRI.  Patient does have  a mild  facial droop as well as difficulty ambulating for which she says is due to weakness.  Will upload MRI,  however if unable to will get CT head and CTAs of the head and neck.  Will get labs including coags, CBC, CMP, Trope, A1c.  Will discuss with neurology team pending imaging.  Disposition likely admission for further workup.    *The above represents an initial assessment/impression. Please refer to progress notes for potential changes in patient clinical course*

## 2024-12-10 ENCOUNTER — APPOINTMENT (OUTPATIENT)
Dept: NEUROLOGY | Facility: CLINIC | Age: 64
End: 2024-12-10
Payer: COMMERCIAL

## 2024-12-10 VITALS
HEART RATE: 68 BPM | HEIGHT: 65 IN | DIASTOLIC BLOOD PRESSURE: 103 MMHG | SYSTOLIC BLOOD PRESSURE: 143 MMHG | WEIGHT: 235 LBS | BODY MASS INDEX: 39.15 KG/M2

## 2024-12-10 DIAGNOSIS — D68.61 ANTIPHOSPHOLIPID SYNDROME: ICD-10-CM

## 2024-12-10 DIAGNOSIS — I63.9 CEREBRAL INFARCTION, UNSPECIFIED: ICD-10-CM

## 2024-12-10 DIAGNOSIS — I67.1 CEREBRAL ANEURYSM, NONRUPTURED: ICD-10-CM

## 2024-12-10 PROCEDURE — 99205 OFFICE O/P NEW HI 60 MIN: CPT

## 2024-12-10 PROCEDURE — 93892 TCD EMBOLI DETECT W/O INJ: CPT

## 2024-12-10 PROCEDURE — G2211 COMPLEX E/M VISIT ADD ON: CPT | Mod: NC

## 2024-12-10 PROCEDURE — 93886 INTRACRANIAL COMPLETE STUDY: CPT

## 2024-12-10 PROCEDURE — 93880 EXTRACRANIAL BILAT STUDY: CPT

## 2024-12-10 PROCEDURE — 99417 PROLNG OP E/M EACH 15 MIN: CPT

## 2024-12-10 RX ORDER — SERTRALINE 25 MG/1
TABLET, FILM COATED ORAL
Refills: 0 | Status: ACTIVE | COMMUNITY

## 2024-12-10 RX ORDER — SACUBITRIL AND VALSARTAN 49; 51 MG/1; MG/1
TABLET, FILM COATED ORAL
Refills: 0 | Status: ACTIVE | COMMUNITY

## 2025-01-28 ENCOUNTER — NON-APPOINTMENT (OUTPATIENT)
Age: 65
End: 2025-01-28

## 2025-01-29 ENCOUNTER — APPOINTMENT (OUTPATIENT)
Dept: NEUROLOGY | Facility: CLINIC | Age: 65
End: 2025-01-29
Payer: COMMERCIAL

## 2025-01-29 PROCEDURE — 95806 SLEEP STUDY UNATT&RESP EFFT: CPT

## 2025-02-10 ENCOUNTER — TRANSCRIPTION ENCOUNTER (OUTPATIENT)
Age: 65
End: 2025-02-10

## 2025-04-11 ENCOUNTER — APPOINTMENT (OUTPATIENT)
Dept: NEUROLOGY | Facility: CLINIC | Age: 65
End: 2025-04-11

## 2025-06-04 ENCOUNTER — APPOINTMENT (OUTPATIENT)
Dept: CT IMAGING | Facility: CLINIC | Age: 65
End: 2025-06-04
Payer: COMMERCIAL

## 2025-06-04 ENCOUNTER — OUTPATIENT (OUTPATIENT)
Dept: OUTPATIENT SERVICES | Facility: HOSPITAL | Age: 65
LOS: 1 days | End: 2025-06-04
Payer: COMMERCIAL

## 2025-06-04 DIAGNOSIS — Z00.8 ENCOUNTER FOR OTHER GENERAL EXAMINATION: ICD-10-CM

## 2025-06-04 DIAGNOSIS — Z98.890 OTHER SPECIFIED POSTPROCEDURAL STATES: Chronic | ICD-10-CM

## 2025-06-04 DIAGNOSIS — R06.02 SHORTNESS OF BREATH: ICD-10-CM

## 2025-06-04 DIAGNOSIS — Z90.710 ACQUIRED ABSENCE OF BOTH CERVIX AND UTERUS: Chronic | ICD-10-CM

## 2025-06-04 DIAGNOSIS — I25.118 ATHEROSCLEROTIC HEART DISEASE OF NATIVE CORONARY ARTERY WITH OTHER FORMS OF ANGINA PECTORIS: ICD-10-CM

## 2025-06-04 PROCEDURE — 75574 CT ANGIO HRT W/3D IMAGE: CPT

## 2025-06-04 PROCEDURE — 75574 CT ANGIO HRT W/3D IMAGE: CPT | Mod: 26

## 2025-06-16 ENCOUNTER — OUTPATIENT (OUTPATIENT)
Dept: OUTPATIENT SERVICES | Facility: HOSPITAL | Age: 65
LOS: 1 days | End: 2025-06-16
Payer: COMMERCIAL

## 2025-06-16 ENCOUNTER — APPOINTMENT (OUTPATIENT)
Dept: MRI IMAGING | Facility: CLINIC | Age: 65
End: 2025-06-16

## 2025-06-16 DIAGNOSIS — Z98.890 OTHER SPECIFIED POSTPROCEDURAL STATES: Chronic | ICD-10-CM

## 2025-06-16 DIAGNOSIS — I67.1 CEREBRAL ANEURYSM, NONRUPTURED: ICD-10-CM

## 2025-06-16 DIAGNOSIS — Z90.710 ACQUIRED ABSENCE OF BOTH CERVIX AND UTERUS: Chronic | ICD-10-CM

## 2025-06-16 PROCEDURE — 70546 MR ANGIOGRAPH HEAD W/O&W/DYE: CPT | Mod: 26

## 2025-06-16 PROCEDURE — A9585: CPT

## 2025-06-16 PROCEDURE — 70546 MR ANGIOGRAPH HEAD W/O&W/DYE: CPT

## 2025-07-17 ENCOUNTER — APPOINTMENT (OUTPATIENT)
Dept: NEUROSURGERY | Facility: CLINIC | Age: 65
End: 2025-07-17

## 2025-07-17 PROCEDURE — 99212 OFFICE O/P EST SF 10 MIN: CPT | Mod: 93

## 2025-07-21 ENCOUNTER — APPOINTMENT (OUTPATIENT)
Dept: PULMONOLOGY | Facility: CLINIC | Age: 65
End: 2025-07-21
Payer: MEDICARE

## 2025-07-21 VITALS
BODY MASS INDEX: 41.99 KG/M2 | HEART RATE: 86 BPM | SYSTOLIC BLOOD PRESSURE: 122 MMHG | HEIGHT: 65 IN | TEMPERATURE: 96.6 F | RESPIRATION RATE: 17 BRPM | OXYGEN SATURATION: 96 % | WEIGHT: 252 LBS | DIASTOLIC BLOOD PRESSURE: 72 MMHG

## 2025-07-21 DIAGNOSIS — K21.9 GASTRO-ESOPHAGEAL REFLUX DISEASE W/OUT ESOPHAGITIS: ICD-10-CM

## 2025-07-21 DIAGNOSIS — Z81.2 FAMILY HISTORY OF TOBACCO ABUSE AND DEPENDENCE: ICD-10-CM

## 2025-07-21 DIAGNOSIS — Z86.711 PERSONAL HISTORY OF PULMONARY EMBOLISM: ICD-10-CM

## 2025-07-21 DIAGNOSIS — R06.02 SHORTNESS OF BREATH: ICD-10-CM

## 2025-07-21 DIAGNOSIS — J45.901 ACUTE BRONCHITIS, UNSPECIFIED: ICD-10-CM

## 2025-07-21 DIAGNOSIS — J30.9 ALLERGIC RHINITIS, UNSPECIFIED: ICD-10-CM

## 2025-07-21 DIAGNOSIS — Z82.49 FAMILY HISTORY OF ISCHEMIC HEART DISEASE AND OTHER DISEASES OF THE CIRCULATORY SYSTEM: ICD-10-CM

## 2025-07-21 DIAGNOSIS — R93.89 ABNORMAL FINDINGS ON DIAGNOSTIC IMAGING OF OTHER SPECIFIED BODY STRUCTURES: ICD-10-CM

## 2025-07-21 DIAGNOSIS — J45.909 UNSPECIFIED ASTHMA, UNCOMPLICATED: ICD-10-CM

## 2025-07-21 DIAGNOSIS — J20.9 ACUTE BRONCHITIS, UNSPECIFIED: ICD-10-CM

## 2025-07-21 DIAGNOSIS — E66.3 OVERWEIGHT: ICD-10-CM

## 2025-07-21 DIAGNOSIS — Z81.1 FAMILY HISTORY OF ALCOHOL ABUSE AND DEPENDENCE: ICD-10-CM

## 2025-07-21 DIAGNOSIS — G47.33 OBSTRUCTIVE SLEEP APNEA (ADULT) (PEDIATRIC): ICD-10-CM

## 2025-07-21 DIAGNOSIS — Z87.828 PERSONAL HISTORY OF OTHER (HEALED) PHYSICAL INJURY AND TRAUMA: ICD-10-CM

## 2025-07-21 PROCEDURE — 94729 DIFFUSING CAPACITY: CPT

## 2025-07-21 PROCEDURE — 99204 OFFICE O/P NEW MOD 45 MIN: CPT | Mod: 25

## 2025-07-21 PROCEDURE — 95012 NITRIC OXIDE EXP GAS DETER: CPT

## 2025-07-21 PROCEDURE — 94727 GAS DIL/WSHOT DETER LNG VOL: CPT

## 2025-07-21 PROCEDURE — 94799 UNLISTED PULMONARY SVC/PX: CPT

## 2025-07-21 PROCEDURE — 71046 X-RAY EXAM CHEST 2 VIEWS: CPT

## 2025-07-21 PROCEDURE — 94618 PULMONARY STRESS TESTING: CPT

## 2025-07-21 PROCEDURE — 94060 EVALUATION OF WHEEZING: CPT

## 2025-07-22 RX ORDER — TIRZEPATIDE 2.5 MG/.5ML
2.5 INJECTION, SOLUTION SUBCUTANEOUS
Qty: 1 | Refills: 0 | Status: ACTIVE | COMMUNITY
Start: 2025-07-22 | End: 1900-01-01

## 2025-07-25 ENCOUNTER — APPOINTMENT (OUTPATIENT)
Dept: CT IMAGING | Facility: CLINIC | Age: 65
End: 2025-07-25
Payer: MEDICARE

## 2025-07-25 ENCOUNTER — OUTPATIENT (OUTPATIENT)
Dept: OUTPATIENT SERVICES | Facility: HOSPITAL | Age: 65
LOS: 1 days | End: 2025-07-25
Payer: MEDICARE

## 2025-07-25 DIAGNOSIS — Z98.890 OTHER SPECIFIED POSTPROCEDURAL STATES: Chronic | ICD-10-CM

## 2025-07-25 DIAGNOSIS — R93.89 ABNORMAL FINDINGS ON DIAGNOSTIC IMAGING OF OTHER SPECIFIED BODY STRUCTURES: ICD-10-CM

## 2025-07-25 DIAGNOSIS — Z90.710 ACQUIRED ABSENCE OF BOTH CERVIX AND UTERUS: Chronic | ICD-10-CM

## 2025-07-25 PROCEDURE — 71250 CT THORAX DX C-: CPT

## 2025-07-25 PROCEDURE — 71250 CT THORAX DX C-: CPT | Mod: 26

## 2025-07-31 RX ORDER — BUDESONIDE AND FORMOTEROL FUMARATE DIHYDRATE 160; 4.5 UG/1; UG/1
160-4.5 AEROSOL RESPIRATORY (INHALATION) TWICE DAILY
Qty: 1 | Refills: 3 | Status: ACTIVE | COMMUNITY
Start: 2025-07-21 | End: 1900-01-01

## 2025-09-05 ENCOUNTER — TRANSCRIPTION ENCOUNTER (OUTPATIENT)
Age: 65
End: 2025-09-05

## 2025-09-05 RX ORDER — TIRZEPATIDE 5 MG/.5ML
5 INJECTION, SOLUTION SUBCUTANEOUS
Qty: 4 | Refills: 0 | Status: ACTIVE | COMMUNITY
Start: 2025-09-05 | End: 1900-01-01